# Patient Record
Sex: FEMALE | Race: WHITE | NOT HISPANIC OR LATINO
[De-identification: names, ages, dates, MRNs, and addresses within clinical notes are randomized per-mention and may not be internally consistent; named-entity substitution may affect disease eponyms.]

---

## 2017-01-19 ENCOUNTER — APPOINTMENT (OUTPATIENT)
Dept: OBGYN | Facility: CLINIC | Age: 62
End: 2017-01-19

## 2017-06-14 ENCOUNTER — APPOINTMENT (OUTPATIENT)
Dept: INTERNAL MEDICINE | Facility: CLINIC | Age: 62
End: 2017-06-14

## 2017-06-14 ENCOUNTER — OUTPATIENT (OUTPATIENT)
Dept: OUTPATIENT SERVICES | Facility: HOSPITAL | Age: 62
LOS: 1 days | Discharge: HOME | End: 2017-06-14

## 2017-06-14 VITALS
HEIGHT: 65 IN | SYSTOLIC BLOOD PRESSURE: 109 MMHG | HEART RATE: 79 BPM | DIASTOLIC BLOOD PRESSURE: 71 MMHG | WEIGHT: 175 LBS | BODY MASS INDEX: 29.16 KG/M2

## 2017-06-28 DIAGNOSIS — R07.89 OTHER CHEST PAIN: ICD-10-CM

## 2017-06-28 DIAGNOSIS — E78.5 HYPERLIPIDEMIA, UNSPECIFIED: ICD-10-CM

## 2017-08-21 ENCOUNTER — APPOINTMENT (OUTPATIENT)
Dept: OBGYN | Facility: CLINIC | Age: 62
End: 2017-08-21

## 2017-08-21 ENCOUNTER — OUTPATIENT (OUTPATIENT)
Dept: OUTPATIENT SERVICES | Facility: HOSPITAL | Age: 62
LOS: 1 days | Discharge: HOME | End: 2017-08-21

## 2017-08-21 VITALS — DIASTOLIC BLOOD PRESSURE: 60 MMHG | BODY MASS INDEX: 29.62 KG/M2 | SYSTOLIC BLOOD PRESSURE: 100 MMHG | WEIGHT: 178 LBS

## 2017-08-21 DIAGNOSIS — Z33.2 ENCOUNTER FOR ELECTIVE TERMINATION OF PREGNANCY: ICD-10-CM

## 2017-08-21 DIAGNOSIS — Z12.31 ENCOUNTER FOR SCREENING MAMMOGRAM FOR MALIGNANT NEOPLASM OF BREAST: ICD-10-CM

## 2017-08-22 ENCOUNTER — RESULT REVIEW (OUTPATIENT)
Age: 62
End: 2017-08-22

## 2017-08-25 ENCOUNTER — RESULT REVIEW (OUTPATIENT)
Age: 62
End: 2017-08-25

## 2017-08-28 LAB — HPV I/H RISK 1 DNA CVX QL PROBE+SIG AMP: NOT DETECTED

## 2017-10-19 LAB
CHOLEST SERPL-MCNC: 193 MG/DL
ESTIMATED AVERGAGE GLUCOSE (NORTH): 137 MG/DL
HBA1C MFR BLD: 6.4 %
HDLC SERPL-MCNC: 66 MG/DL
HDLC SERPL: 2.92
LDLC SERPL DIRECT ASSAY-MCNC: 105 MG/DL
TRIGL SERPL-MCNC: 115 MG/DL
VLDLC SERPL-MCNC: 23 MG/DL

## 2018-06-14 ENCOUNTER — OUTPATIENT (OUTPATIENT)
Dept: OUTPATIENT SERVICES | Facility: HOSPITAL | Age: 63
LOS: 1 days | Discharge: HOME | End: 2018-06-14

## 2018-06-14 DIAGNOSIS — Z00.00 ENCOUNTER FOR GENERAL ADULT MEDICAL EXAMINATION WITHOUT ABNORMAL FINDINGS: ICD-10-CM

## 2018-07-24 ENCOUNTER — APPOINTMENT (OUTPATIENT)
Dept: INTERNAL MEDICINE | Facility: CLINIC | Age: 63
End: 2018-07-24

## 2018-07-24 ENCOUNTER — OUTPATIENT (OUTPATIENT)
Dept: OUTPATIENT SERVICES | Facility: HOSPITAL | Age: 63
LOS: 1 days | Discharge: HOME | End: 2018-07-24

## 2018-07-24 VITALS
HEIGHT: 65 IN | DIASTOLIC BLOOD PRESSURE: 74 MMHG | WEIGHT: 183 LBS | HEART RATE: 81 BPM | BODY MASS INDEX: 30.49 KG/M2 | SYSTOLIC BLOOD PRESSURE: 118 MMHG

## 2018-07-24 DIAGNOSIS — Z87.828 PERSONAL HISTORY OF OTHER (HEALED) PHYSICAL INJURY AND TRAUMA: ICD-10-CM

## 2018-07-24 DIAGNOSIS — M89.8X9 OTHER SPECIFIED DISORDERS OF BONE, UNSPECIFIED SITE: ICD-10-CM

## 2018-07-24 DIAGNOSIS — R07.89 OTHER CHEST PAIN: ICD-10-CM

## 2018-07-24 DIAGNOSIS — Z87.2 PERSONAL HISTORY OF DISEASES OF THE SKIN AND SUBCUTANEOUS TISSUE: ICD-10-CM

## 2018-07-24 RX ORDER — ATORVASTATIN CALCIUM 80 MG/1
TABLET, FILM COATED ORAL
Refills: 0 | Status: DISCONTINUED | COMMUNITY
End: 2018-07-24

## 2018-07-24 NOTE — HISTORY OF PRESENT ILLNESS
[FreeTextEntry1] : pt complains on pain in the all bones,pain in the left lower extr. what pt related with atorvastatin.

## 2018-07-24 NOTE — ASSESSMENT
[FreeTextEntry1] : 1.Left lower extr. pain pt related with atorvastatin 40 mg,after dose of atorvastatin was decreased to 20 mg pain was decreased.Recom. switch atorvastatin to pravastatin\par 2.Prediabetes:check HgA1C\par 3.Hypercholesterolemia:recom.switch atorvastatin to pravastatin 40 mg,check lipids\par 4.Bone pain,difficulty walking:DEXa scan\par 5.Depression :pt is f/u with psych.\par 6.HCM:PAP smear,colonoscopy,mammogram UTD;recom. mammogram

## 2018-07-24 NOTE — PHYSICAL EXAM
[Supple] : supple [Clear to Auscultation] : lungs were clear to auscultation bilaterally [Regular Rhythm] : with a regular rhythm [Normal S1, S2] : normal S1 and S2 [No Murmur] : no murmur heard [No Edema] : there was no peripheral edema [Soft] : abdomen soft [Non Tender] : non-tender [Non-distended] : non-distended [No Joint Swelling] : no joint swelling

## 2018-07-25 DIAGNOSIS — E78.5 HYPERLIPIDEMIA, UNSPECIFIED: ICD-10-CM

## 2018-07-25 DIAGNOSIS — F33.0 MAJOR DEPRESSIVE DISORDER, RECURRENT, MILD: ICD-10-CM

## 2018-07-25 DIAGNOSIS — R73.9 HYPERGLYCEMIA, UNSPECIFIED: ICD-10-CM

## 2018-07-25 DIAGNOSIS — M89.8X9 OTHER SPECIFIED DISORDERS OF BONE, UNSPECIFIED SITE: ICD-10-CM

## 2018-11-09 ENCOUNTER — RX RENEWAL (OUTPATIENT)
Age: 63
End: 2018-11-09

## 2019-03-26 ENCOUNTER — FORM ENCOUNTER (OUTPATIENT)
Age: 64
End: 2019-03-26

## 2019-03-27 ENCOUNTER — APPOINTMENT (OUTPATIENT)
Dept: INTERNAL MEDICINE | Facility: CLINIC | Age: 64
End: 2019-03-27

## 2019-03-27 ENCOUNTER — OUTPATIENT (OUTPATIENT)
Dept: OUTPATIENT SERVICES | Facility: HOSPITAL | Age: 64
LOS: 1 days | Discharge: HOME | End: 2019-03-27

## 2019-03-27 VITALS
WEIGHT: 187 LBS | SYSTOLIC BLOOD PRESSURE: 120 MMHG | BODY MASS INDEX: 31.16 KG/M2 | TEMPERATURE: 96.3 F | HEART RATE: 82 BPM | HEIGHT: 65 IN | DIASTOLIC BLOOD PRESSURE: 78 MMHG

## 2019-03-27 DIAGNOSIS — R73.03 PREDIABETES: ICD-10-CM

## 2019-03-27 DIAGNOSIS — M25.569 PAIN IN UNSPECIFIED KNEE: ICD-10-CM

## 2019-03-27 DIAGNOSIS — M25.562 PAIN IN LEFT KNEE: ICD-10-CM

## 2019-03-27 DIAGNOSIS — E78.5 HYPERLIPIDEMIA, UNSPECIFIED: ICD-10-CM

## 2019-03-27 NOTE — PHYSICAL EXAM
[No Acute Distress] : no acute distress [Well Nourished] : well nourished [No Respiratory Distress] : no respiratory distress  [Clear to Auscultation] : lungs were clear to auscultation bilaterally [Normal Rate] : normal rate  [Regular Rhythm] : with a regular rhythm [Normal S1, S2] : normal S1 and S2 [Soft] : abdomen soft [Non Tender] : non-tender [No HSM] : no HSM [de-identified] : left knee pain on palpation
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
## 2019-03-27 NOTE — ASSESSMENT
[FreeTextEntry1] : 61 year old female with past medical history of hyperlipidemia, prediabetes presents left knee pain. \par \par # Left knee pain, likely associated with meniscal damage as per examination and history\par - XR L knee ordered\par - c/w pain meds prn \par \par # Prediabetes\par -check HgA1C\par \par # Hypercholesterolemia\par - c/w pravastatin 40mg\par \par # Depression\par - pt is f/u with psych\par \par HCM\par PAP smear\par colonoscopy\par mammogram\par UTD\par mammogram

## 2019-03-27 NOTE — REVIEW OF SYSTEMS
[Joint Pain] : joint pain [Joint Stiffness] : joint stiffness [Joint Swelling] : joint swelling [Muscle Weakness] : muscle weakness [Back Pain] : back pain [Negative] : Gastrointestinal

## 2019-03-29 DIAGNOSIS — Z02.9 ENCOUNTER FOR ADMINISTRATIVE EXAMINATIONS, UNSPECIFIED: ICD-10-CM

## 2019-04-10 ENCOUNTER — OUTPATIENT (OUTPATIENT)
Dept: OUTPATIENT SERVICES | Facility: HOSPITAL | Age: 64
LOS: 1 days | Discharge: HOME | End: 2019-04-10

## 2019-04-10 ENCOUNTER — APPOINTMENT (OUTPATIENT)
Dept: ORTHOPEDIC SURGERY | Facility: CLINIC | Age: 64
End: 2019-04-10

## 2019-04-16 ENCOUNTER — OUTPATIENT (OUTPATIENT)
Dept: OUTPATIENT SERVICES | Facility: HOSPITAL | Age: 64
LOS: 1 days | Discharge: HOME | End: 2019-04-16
Payer: MEDICARE

## 2019-04-16 DIAGNOSIS — M25.569 PAIN IN UNSPECIFIED KNEE: ICD-10-CM

## 2019-04-16 PROCEDURE — 73721 MRI JNT OF LWR EXTRE W/O DYE: CPT | Mod: 26,LT

## 2019-04-30 ENCOUNTER — MEDICATION RENEWAL (OUTPATIENT)
Age: 64
End: 2019-04-30

## 2019-05-01 ENCOUNTER — APPOINTMENT (OUTPATIENT)
Dept: ORTHOPEDIC SURGERY | Facility: CLINIC | Age: 64
End: 2019-05-01

## 2019-05-01 ENCOUNTER — OUTPATIENT (OUTPATIENT)
Dept: OUTPATIENT SERVICES | Facility: HOSPITAL | Age: 64
LOS: 1 days | Discharge: HOME | End: 2019-05-01

## 2019-05-14 ENCOUNTER — OUTPATIENT (OUTPATIENT)
Dept: OUTPATIENT SERVICES | Facility: HOSPITAL | Age: 64
LOS: 1 days | Discharge: HOME | End: 2019-05-14
Payer: MEDICARE

## 2019-05-14 VITALS
SYSTOLIC BLOOD PRESSURE: 104 MMHG | HEART RATE: 90 BPM | DIASTOLIC BLOOD PRESSURE: 66 MMHG | TEMPERATURE: 98 F | RESPIRATION RATE: 20 BRPM | HEIGHT: 65 IN | WEIGHT: 185.41 LBS | OXYGEN SATURATION: 100 %

## 2019-05-14 DIAGNOSIS — S83.272A COMPLEX TEAR OF LATERAL MENISCUS, CURRENT INJURY, LEFT KNEE, INITIAL ENCOUNTER: ICD-10-CM

## 2019-05-14 DIAGNOSIS — Z01.818 ENCOUNTER FOR OTHER PREPROCEDURAL EXAMINATION: ICD-10-CM

## 2019-05-14 LAB
ALBUMIN SERPL ELPH-MCNC: 4.1 G/DL — SIGNIFICANT CHANGE UP (ref 3.5–5.2)
ALP SERPL-CCNC: 59 U/L — SIGNIFICANT CHANGE UP (ref 30–115)
ALT FLD-CCNC: 14 U/L — SIGNIFICANT CHANGE UP (ref 0–41)
ANION GAP SERPL CALC-SCNC: 16 MMOL/L — HIGH (ref 7–14)
APTT BLD: 32.6 SEC — SIGNIFICANT CHANGE UP (ref 27–39.2)
AST SERPL-CCNC: 13 U/L — SIGNIFICANT CHANGE UP (ref 0–41)
BASOPHILS # BLD AUTO: 0.05 K/UL — SIGNIFICANT CHANGE UP (ref 0–0.2)
BASOPHILS NFR BLD AUTO: 0.6 % — SIGNIFICANT CHANGE UP (ref 0–1)
BILIRUB SERPL-MCNC: 0.5 MG/DL — SIGNIFICANT CHANGE UP (ref 0.2–1.2)
BUN SERPL-MCNC: 20 MG/DL — SIGNIFICANT CHANGE UP (ref 10–20)
CALCIUM SERPL-MCNC: 9.3 MG/DL — SIGNIFICANT CHANGE UP (ref 8.5–10.1)
CHLORIDE SERPL-SCNC: 102 MMOL/L — SIGNIFICANT CHANGE UP (ref 98–110)
CO2 SERPL-SCNC: 24 MMOL/L — SIGNIFICANT CHANGE UP (ref 17–32)
CREAT SERPL-MCNC: 0.8 MG/DL — SIGNIFICANT CHANGE UP (ref 0.7–1.5)
EOSINOPHIL # BLD AUTO: 0.26 K/UL — SIGNIFICANT CHANGE UP (ref 0–0.7)
EOSINOPHIL NFR BLD AUTO: 3.1 % — SIGNIFICANT CHANGE UP (ref 0–8)
GLUCOSE SERPL-MCNC: 116 MG/DL — HIGH (ref 70–99)
HCT VFR BLD CALC: 37 % — SIGNIFICANT CHANGE UP (ref 37–47)
HGB BLD-MCNC: 11.9 G/DL — LOW (ref 12–16)
IMM GRANULOCYTES NFR BLD AUTO: 0.4 % — HIGH (ref 0.1–0.3)
INR BLD: 0.97 RATIO — SIGNIFICANT CHANGE UP (ref 0.65–1.3)
LYMPHOCYTES # BLD AUTO: 2.02 K/UL — SIGNIFICANT CHANGE UP (ref 1.2–3.4)
LYMPHOCYTES # BLD AUTO: 24.1 % — SIGNIFICANT CHANGE UP (ref 20.5–51.1)
MCHC RBC-ENTMCNC: 28.1 PG — SIGNIFICANT CHANGE UP (ref 27–31)
MCHC RBC-ENTMCNC: 32.2 G/DL — SIGNIFICANT CHANGE UP (ref 32–37)
MCV RBC AUTO: 87.5 FL — SIGNIFICANT CHANGE UP (ref 81–99)
MONOCYTES # BLD AUTO: 0.6 K/UL — SIGNIFICANT CHANGE UP (ref 0.1–0.6)
MONOCYTES NFR BLD AUTO: 7.2 % — SIGNIFICANT CHANGE UP (ref 1.7–9.3)
NEUTROPHILS # BLD AUTO: 5.42 K/UL — SIGNIFICANT CHANGE UP (ref 1.4–6.5)
NEUTROPHILS NFR BLD AUTO: 64.6 % — SIGNIFICANT CHANGE UP (ref 42.2–75.2)
NRBC # BLD: 0 /100 WBCS — SIGNIFICANT CHANGE UP (ref 0–0)
PLATELET # BLD AUTO: 323 K/UL — SIGNIFICANT CHANGE UP (ref 130–400)
POTASSIUM SERPL-MCNC: 4.2 MMOL/L — SIGNIFICANT CHANGE UP (ref 3.5–5)
POTASSIUM SERPL-SCNC: 4.2 MMOL/L — SIGNIFICANT CHANGE UP (ref 3.5–5)
PROT SERPL-MCNC: 6.5 G/DL — SIGNIFICANT CHANGE UP (ref 6–8)
PROTHROM AB SERPL-ACNC: 11.2 SEC — SIGNIFICANT CHANGE UP (ref 9.95–12.87)
RBC # BLD: 4.23 M/UL — SIGNIFICANT CHANGE UP (ref 4.2–5.4)
RBC # FLD: 12.9 % — SIGNIFICANT CHANGE UP (ref 11.5–14.5)
SODIUM SERPL-SCNC: 142 MMOL/L — SIGNIFICANT CHANGE UP (ref 135–146)
WBC # BLD: 8.38 K/UL — SIGNIFICANT CHANGE UP (ref 4.8–10.8)
WBC # FLD AUTO: 8.38 K/UL — SIGNIFICANT CHANGE UP (ref 4.8–10.8)

## 2019-05-14 PROCEDURE — 93010 ELECTROCARDIOGRAM REPORT: CPT

## 2019-05-14 PROCEDURE — 71046 X-RAY EXAM CHEST 2 VIEWS: CPT | Mod: 26

## 2019-05-14 NOTE — H&P PST ADULT - HISTORY OF PRESENT ILLNESS
63 y/o scheduled for left knee arthroscopy lateral meniscectomy  reports no c/o cp,sob,palpitations,cough or dysuria  1-2 fos without sob

## 2019-05-17 ENCOUNTER — OUTPATIENT (OUTPATIENT)
Dept: OUTPATIENT SERVICES | Facility: HOSPITAL | Age: 64
LOS: 1 days | Discharge: HOME | End: 2019-05-17

## 2019-05-17 ENCOUNTER — APPOINTMENT (OUTPATIENT)
Dept: INTERNAL MEDICINE | Facility: CLINIC | Age: 64
End: 2019-05-17

## 2019-05-17 VITALS — SYSTOLIC BLOOD PRESSURE: 122 MMHG | HEART RATE: 86 BPM | DIASTOLIC BLOOD PRESSURE: 81 MMHG

## 2019-05-17 DIAGNOSIS — M25.569 PAIN IN UNSPECIFIED KNEE: ICD-10-CM

## 2019-05-17 PROBLEM — E78.00 PURE HYPERCHOLESTEROLEMIA, UNSPECIFIED: Chronic | Status: ACTIVE | Noted: 2019-05-14

## 2019-05-17 PROBLEM — F32.9 MAJOR DEPRESSIVE DISORDER, SINGLE EPISODE, UNSPECIFIED: Chronic | Status: ACTIVE | Noted: 2019-05-14

## 2019-05-17 NOTE — HISTORY OF PRESENT ILLNESS
[de-identified] : 61 year old female with past medical history of hyperlipidemia, prediabetes presents left knee pain. being evlauated by orhto for osteoarthritis here for pre op testing clearance. Pt r/o meniscal tear. EKG CXR and Labs all wnl

## 2019-05-17 NOTE — PHYSICAL EXAM
[No Acute Distress] : no acute distress [Well Nourished] : well nourished [No Respiratory Distress] : no respiratory distress  [Clear to Auscultation] : lungs were clear to auscultation bilaterally [Normal Rate] : normal rate  [Regular Rhythm] : with a regular rhythm [Normal S1, S2] : normal S1 and S2 [Soft] : abdomen soft [Non Tender] : non-tender [No HSM] : no HSM [de-identified] : left knee pain on palpation

## 2019-05-17 NOTE — ASSESSMENT
[FreeTextEntry1] : 61 year old female with past medical history of hyperlipidemia, prediabetes presents left knee pain. \par \par # Left knee pain scheduled for L knee arthroscopy here for pre op clearance\par - XR L knee ordered lateral osteo arthrisits. Low risk for low risk procedure. Paper work signed\par - \par # Prediabetes -check HgA1C\par # Hypercholesterolemia - c/w pravastatin 40mg\par # Depression- pt is f/u with psych\par # HCM - PAP smear colonoscopy mammogram UTD. \par rto in 2 monthsand prn

## 2019-06-05 ENCOUNTER — APPOINTMENT (OUTPATIENT)
Dept: INTERNAL MEDICINE | Facility: CLINIC | Age: 64
End: 2019-06-05

## 2019-06-05 ENCOUNTER — OUTPATIENT (OUTPATIENT)
Dept: OUTPATIENT SERVICES | Facility: HOSPITAL | Age: 64
LOS: 1 days | Discharge: HOME | End: 2019-06-05

## 2019-06-05 VITALS
HEIGHT: 65 IN | DIASTOLIC BLOOD PRESSURE: 79 MMHG | BODY MASS INDEX: 30.16 KG/M2 | HEART RATE: 93 BPM | WEIGHT: 181 LBS | SYSTOLIC BLOOD PRESSURE: 124 MMHG

## 2019-06-05 NOTE — HISTORY OF PRESENT ILLNESS
[de-identified] : 61 year old female with past medical history of hyperlipidemia, prediabetes presents left knee pain. being evaluated by ortho for osteoarthritis here for pre op testing clearance. Pt r/o meniscal tear. EKG CXR and Labs all wnl.  Patient returned to office today for preop clearance once again for lesiions on her right arm.  As per patient she was about to have surgery however admitted to the staff at the surgical center that she had a bed bug infection in recent past.  The surgery was cancelled and patient was told to return to a medical clinic for clearance.  Patient is no longer infected with bed bugs.  Clearance will once again be given this visit.

## 2019-06-05 NOTE — ASSESSMENT
[FreeTextEntry1] : 61 year old female with past medical history of hyperlipidemia, prediabetes presents left knee pain. \par \par # Left knee pain scheduled for L knee arthroscopy here for pre op clearance\par - XR L knee ordered lateral osteo arthrisits. Low risk for low risk procedure\par - Patient has no evidence of active bed bug infection and lesions on right arm are healed and old.  Patient is perfectly ok to proceed with planned surgery.  \par - \par # Prediabetes -check HgA1C\par # Hypercholesterolemia - c/w pravastatin 40mg\par # Depression- pt is f/u with psych\par # HCM - PAP smear colonoscopy mammogram UTD. \par rto in 2 monthsand prn

## 2019-06-06 DIAGNOSIS — E78.5 HYPERLIPIDEMIA, UNSPECIFIED: ICD-10-CM

## 2019-06-06 DIAGNOSIS — R73.03 PREDIABETES: ICD-10-CM

## 2019-06-06 DIAGNOSIS — S83.289A OTHER TEAR OF LATERAL MENISCUS, CURRENT INJURY, UNSPECIFIED KNEE, INITIAL ENCOUNTER: ICD-10-CM

## 2019-06-13 ENCOUNTER — OUTPATIENT (OUTPATIENT)
Dept: OUTPATIENT SERVICES | Facility: HOSPITAL | Age: 64
LOS: 1 days | Discharge: HOME | End: 2019-06-13
Payer: MEDICARE

## 2019-06-13 ENCOUNTER — RESULT REVIEW (OUTPATIENT)
Age: 64
End: 2019-06-13

## 2019-06-13 VITALS
HEART RATE: 76 BPM | SYSTOLIC BLOOD PRESSURE: 137 MMHG | DIASTOLIC BLOOD PRESSURE: 63 MMHG | RESPIRATION RATE: 16 BRPM | OXYGEN SATURATION: 99 %

## 2019-06-13 VITALS
WEIGHT: 185.41 LBS | RESPIRATION RATE: 18 BRPM | SYSTOLIC BLOOD PRESSURE: 138 MMHG | DIASTOLIC BLOOD PRESSURE: 75 MMHG | OXYGEN SATURATION: 96 % | HEIGHT: 65 IN | TEMPERATURE: 98 F | HEART RATE: 88 BPM

## 2019-06-13 DIAGNOSIS — S83.259A BUCKET-HANDLE TEAR OF LATERAL MENISCUS, CURRENT INJURY, UNSPECIFIED KNEE, INITIAL ENCOUNTER: ICD-10-CM

## 2019-06-13 PROCEDURE — 88304 TISSUE EXAM BY PATHOLOGIST: CPT | Mod: 26

## 2019-06-13 RX ORDER — BUPROPION HYDROCHLORIDE 150 MG/1
1 TABLET, EXTENDED RELEASE ORAL
Qty: 0 | Refills: 0 | DISCHARGE

## 2019-06-13 RX ORDER — HYDROMORPHONE HYDROCHLORIDE 2 MG/ML
0.5 INJECTION INTRAMUSCULAR; INTRAVENOUS; SUBCUTANEOUS
Refills: 0 | Status: DISCONTINUED | OUTPATIENT
Start: 2019-06-13 | End: 2019-06-13

## 2019-06-13 RX ORDER — ONDANSETRON 8 MG/1
4 TABLET, FILM COATED ORAL ONCE
Refills: 0 | Status: DISCONTINUED | OUTPATIENT
Start: 2019-06-13 | End: 2019-06-28

## 2019-06-13 RX ORDER — OXYCODONE HYDROCHLORIDE 5 MG/1
5 TABLET ORAL ONCE
Refills: 0 | Status: DISCONTINUED | OUTPATIENT
Start: 2019-06-13 | End: 2019-06-13

## 2019-06-13 RX ORDER — SODIUM CHLORIDE 9 MG/ML
1000 INJECTION, SOLUTION INTRAVENOUS
Refills: 0 | Status: DISCONTINUED | OUTPATIENT
Start: 2019-06-13 | End: 2019-06-28

## 2019-06-13 RX ADMIN — OXYCODONE HYDROCHLORIDE 5 MILLIGRAM(S): 5 TABLET ORAL at 15:57

## 2019-06-13 RX ADMIN — OXYCODONE HYDROCHLORIDE 5 MILLIGRAM(S): 5 TABLET ORAL at 17:11

## 2019-06-13 RX ADMIN — HYDROMORPHONE HYDROCHLORIDE 0.5 MILLIGRAM(S): 2 INJECTION INTRAMUSCULAR; INTRAVENOUS; SUBCUTANEOUS at 14:52

## 2019-06-13 RX ADMIN — HYDROMORPHONE HYDROCHLORIDE 0.5 MILLIGRAM(S): 2 INJECTION INTRAMUSCULAR; INTRAVENOUS; SUBCUTANEOUS at 15:22

## 2019-06-13 RX ADMIN — HYDROMORPHONE HYDROCHLORIDE 0.5 MILLIGRAM(S): 2 INJECTION INTRAMUSCULAR; INTRAVENOUS; SUBCUTANEOUS at 17:11

## 2019-06-13 RX ADMIN — HYDROMORPHONE HYDROCHLORIDE 0.5 MILLIGRAM(S): 2 INJECTION INTRAMUSCULAR; INTRAVENOUS; SUBCUTANEOUS at 15:29

## 2019-06-13 RX ADMIN — SODIUM CHLORIDE 50 MILLILITER(S): 9 INJECTION, SOLUTION INTRAVENOUS at 15:12

## 2019-06-13 RX ADMIN — HYDROMORPHONE HYDROCHLORIDE 0.5 MILLIGRAM(S): 2 INJECTION INTRAMUSCULAR; INTRAVENOUS; SUBCUTANEOUS at 15:03

## 2019-06-13 NOTE — ASU DISCHARGE PLAN (ADULT/PEDIATRIC) - ASU DC SPECIAL INSTRUCTIONSFT
Post-Operative Knee Arthroscopy Instructions    Anesthesia:  - No Alcoholic beverages, including beer and wine, for 24 hours or while on prescribed pain medications  - Do not make any important decisions or sign any legal documents  - Do not drive or operate machinery for 24 hours  - You are required upon discharge to leave the surgical center with a responsible adult who will drive you home    Surgery:  - Stay indoors for 2 days  - Continue weight bearing as tolerated - only use crutches for severe pain  - Stairs can be done one step at a time  - Keep clean and dry for 3 days  - Remove dressing in 3 days and cover wounds with band-aids, then you can shower  - If knee is swollen, ice for 10 minutes, 3 times daily while awake  - Ankle range of motion 10 times every hour when awake to both lower extremities for 3 days.  This increases circulation and decreases swelling and decreases the chance for clots  - Take pain medication as prescribed  - Resume regular diet  - Follow-up in approximately 1 week    FOR QUESTIONS OR CONCERNS, CALL [phone]    Notify your doctor if you develop: fever, chills, excessive sweating, drainage, pain not controlled by pain medication      IF AN EMERGENCY ARISES CALL [phone] AND/OR GO TO THE EMERGENCY ROOM    DR. WEI  [phone]

## 2019-06-13 NOTE — CHART NOTE - NSCHARTNOTEFT_GEN_A_CORE
PACU ANESTHESIA ADMISSION NOTE      Procedure: Lateral meniscectomy of left knee    Post op diagnosis:  Bucket handle tear of lateral meniscus      ____  Intubated  TV:______       Rate: ______      FiO2: ______    _x___  Patent Airway    __x__  Full return of protective reflexes    ___x_  Full recovery from anesthesia / back to baseline         Mental Status:  ____ Awake   _____ Alert   ___x__ Drowsy   _____ Sedated    Nausea/Vomiting:  ____ NO  ______Yes,   See Post - Op Orders          Pain Scale (0-10):  _____    Treatment: ____ None    __x__ See Post - Op/PCA Orders    Post - Operative Fluids:   ____ Oral   _x___ See Post - Op Orders    Plan: Discharge:   __x__Home       _____Floor     _____Critical Care    _____  Other:_________________    Comments:

## 2019-06-13 NOTE — PRE-ANESTHESIA EVALUATION ADULT - NSANTHADDINFOFT_GEN_ALL_CORE
Patient has lesions on right arm that were suspected to be prior bed bud infection.  Note in chart states that "patient has no evidence of active bed bug infection and lesions on right arm are healed and old.  Patient is perfectly ok to proceed with planned surgery."

## 2019-06-17 LAB — SURGICAL PATHOLOGY STUDY: SIGNIFICANT CHANGE UP

## 2019-06-19 DIAGNOSIS — M65.88 OTHER SYNOVITIS AND TENOSYNOVITIS, OTHER SITE: ICD-10-CM
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
## 2019-06-19 DIAGNOSIS — S83.252A BUCKET-HANDLE TEAR OF LATERAL MENISCUS, CURRENT INJURY, LEFT KNEE, INITIAL ENCOUNTER: ICD-10-CM

## 2019-06-19 DIAGNOSIS — E78.00 PURE HYPERCHOLESTEROLEMIA, UNSPECIFIED: ICD-10-CM

## 2019-06-19 DIAGNOSIS — F32.9 MAJOR DEPRESSIVE DISORDER, SINGLE EPISODE, UNSPECIFIED: ICD-10-CM

## 2019-06-19 DIAGNOSIS — Z88.5 ALLERGY STATUS TO NARCOTIC AGENT: ICD-10-CM
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
## 2019-12-10 ENCOUNTER — APPOINTMENT (OUTPATIENT)
Dept: INTERNAL MEDICINE | Facility: CLINIC | Age: 64
End: 2019-12-10
Payer: MEDICARE

## 2019-12-10 ENCOUNTER — OUTPATIENT (OUTPATIENT)
Dept: OUTPATIENT SERVICES | Facility: HOSPITAL | Age: 64
LOS: 1 days | Discharge: HOME | End: 2019-12-10

## 2019-12-10 DIAGNOSIS — Z82.49 FAMILY HISTORY OF ISCHEMIC HEART DISEASE AND OTHER DISEASES OF THE CIRCULATORY SYSTEM: ICD-10-CM

## 2019-12-10 PROCEDURE — 99213 OFFICE O/P EST LOW 20 MIN: CPT | Mod: GC

## 2019-12-10 NOTE — HISTORY OF PRESENT ILLNESS
[FreeTextEntry1] : no complaints [de-identified] : f/u h/o depression stable onbuprobion f/u with psych.dyslipidemia on pravastatin

## 2019-12-16 DIAGNOSIS — Z00.00 ENCOUNTER FOR GENERAL ADULT MEDICAL EXAMINATION WITHOUT ABNORMAL FINDINGS: ICD-10-CM

## 2019-12-16 DIAGNOSIS — F32.9 MAJOR DEPRESSIVE DISORDER, SINGLE EPISODE, UNSPECIFIED: ICD-10-CM

## 2019-12-16 DIAGNOSIS — Z82.49 FAMILY HISTORY OF ISCHEMIC HEART DISEASE AND OTHER DISEASES OF THE CIRCULATORY SYSTEM: ICD-10-CM

## 2019-12-16 DIAGNOSIS — E78.5 HYPERLIPIDEMIA, UNSPECIFIED: ICD-10-CM

## 2020-04-15 NOTE — H&P PST ADULT - PRO INTERPRETER NEED 2
Please have the patient follow-up with me in 3 months. Schedule it for a video visit now and if we are open to see patients in the next 3 months please change it to a regular office visit.
English

## 2020-06-15 ENCOUNTER — RX RENEWAL (OUTPATIENT)
Age: 65
End: 2020-06-15

## 2020-07-07 ENCOUNTER — APPOINTMENT (OUTPATIENT)
Dept: INTERNAL MEDICINE | Facility: CLINIC | Age: 65
End: 2020-07-07

## 2020-11-05 ENCOUNTER — RESULT REVIEW (OUTPATIENT)
Age: 65
End: 2020-11-05

## 2020-11-05 ENCOUNTER — OUTPATIENT (OUTPATIENT)
Dept: OUTPATIENT SERVICES | Facility: HOSPITAL | Age: 65
LOS: 1 days | Discharge: HOME | End: 2020-11-05
Payer: MEDICARE

## 2020-11-05 DIAGNOSIS — Z12.31 ENCOUNTER FOR SCREENING MAMMOGRAM FOR MALIGNANT NEOPLASM OF BREAST: ICD-10-CM

## 2020-11-05 PROCEDURE — 77067 SCR MAMMO BI INCL CAD: CPT | Mod: 26

## 2020-11-05 PROCEDURE — 77063 BREAST TOMOSYNTHESIS BI: CPT | Mod: 26

## 2021-07-09 ENCOUNTER — EMERGENCY (EMERGENCY)
Facility: HOSPITAL | Age: 66
LOS: 0 days | Discharge: HOME | End: 2021-07-09
Attending: STUDENT IN AN ORGANIZED HEALTH CARE EDUCATION/TRAINING PROGRAM | Admitting: STUDENT IN AN ORGANIZED HEALTH CARE EDUCATION/TRAINING PROGRAM
Payer: MEDICARE

## 2021-07-09 VITALS
SYSTOLIC BLOOD PRESSURE: 125 MMHG | HEIGHT: 65 IN | OXYGEN SATURATION: 97 % | DIASTOLIC BLOOD PRESSURE: 64 MMHG | HEART RATE: 86 BPM | TEMPERATURE: 98 F | RESPIRATION RATE: 20 BRPM | WEIGHT: 190.04 LBS

## 2021-07-09 DIAGNOSIS — Z79.899 OTHER LONG TERM (CURRENT) DRUG THERAPY: ICD-10-CM

## 2021-07-09 DIAGNOSIS — R21 RASH AND OTHER NONSPECIFIC SKIN ERUPTION: ICD-10-CM

## 2021-07-09 DIAGNOSIS — Z82.49 FAMILY HISTORY OF ISCHEMIC HEART DISEASE AND OTHER DISEASES OF THE CIRCULATORY SYSTEM: ICD-10-CM

## 2021-07-09 DIAGNOSIS — E78.00 PURE HYPERCHOLESTEROLEMIA, UNSPECIFIED: ICD-10-CM

## 2021-07-09 DIAGNOSIS — Z79.02 LONG TERM (CURRENT) USE OF ANTITHROMBOTICS/ANTIPLATELETS: ICD-10-CM

## 2021-07-09 DIAGNOSIS — E78.5 HYPERLIPIDEMIA, UNSPECIFIED: ICD-10-CM

## 2021-07-09 DIAGNOSIS — J45.909 UNSPECIFIED ASTHMA, UNCOMPLICATED: ICD-10-CM

## 2021-07-09 DIAGNOSIS — F32.9 MAJOR DEPRESSIVE DISORDER, SINGLE EPISODE, UNSPECIFIED: ICD-10-CM

## 2021-07-09 DIAGNOSIS — Z88.5 ALLERGY STATUS TO NARCOTIC AGENT: ICD-10-CM

## 2021-07-09 DIAGNOSIS — Z85.038 PERSONAL HISTORY OF OTHER MALIGNANT NEOPLASM OF LARGE INTESTINE: ICD-10-CM

## 2021-07-09 PROCEDURE — 99283 EMERGENCY DEPT VISIT LOW MDM: CPT | Mod: GC

## 2021-07-09 RX ORDER — DIPHENHYDRAMINE HCL 50 MG
1 CAPSULE ORAL
Qty: 5 | Refills: 0
Start: 2021-07-09 | End: 2021-07-13

## 2021-07-09 RX ADMIN — Medication 20 MILLIGRAM(S): at 17:54

## 2021-07-09 NOTE — ED PROVIDER NOTE - PHYSICAL EXAMINATION
Vital Signs: Reviewed  GEN: alert, NAD, speaks full sentences  HEAD:  normocephalic, atraumatic  EYES:  PERRLA; conjunctivae without injection, drainage or discharge  ENMT:  nasal mucosa moist; mouth moist without ulcerations or lesions; throat moist without erythema, exudate, ulcerations or lesions  NECK:  supple, no stridor on auscultation  CARDIAC:  regular rate, normal S1 and S2, no murmurs  RESP:  respiratory rate and effort appear normal for age; lungs are clear to auscultation bilaterally; no stridor, rales, or wheezes  ABDOMEN:  soft, nontender, nondistended  MUSCULOSKELETAL/NEURO:  normal movement, normal tone  SKIN: numerous patches of erythema and vesicles over volar arms, chest, lower neck; otherwise normal skin color for age and race, well-perfused; warm and dry

## 2021-07-09 NOTE — ED PROVIDER NOTE - PATIENT PORTAL LINK FT
You can access the FollowMyHealth Patient Portal offered by [facility] by registering at the following website: [email or web address]. By joining [employer]’s FollowMyHealth portal, you will also be able to view your health information using other applications (apps) compatible with our system.

## 2021-07-09 NOTE — ED ADULT TRIAGE NOTE - CHIEF COMPLAINT QUOTE
Pt presented with rash to B/L arms/hands/chest. Pt states she was gardening yesterday and tried cutting poison ivy. No c/o throat/facial/tongue swelling. Denies tingling to tongue or throat.

## 2021-07-09 NOTE — ED PROVIDER NOTE - FAMILY HISTORY
Father  Still living? Unknown  FH: CAD (coronary artery disease), Age at diagnosis: Age Unknown  FHx: colon cancer, Age at diagnosis: Age Unknown

## 2021-07-09 NOTE — ED PROVIDER NOTE - OBJECTIVE STATEMENT
66yF pmhx HLD, asthma, depression c/o rash 1day; constant, itchy, stable; distributed over arms and upper body; states she was pruning poison ivy outside of a school yesterday, started feeling itchy spot on arm, scratched it overnight and noticed it spread; reports numerous exposures to poison ivy in past, current sx feel similar, states they are usually alleviated by prednisone. Denies throat scratching, SOB, chest pain, fever/chills, abd pain, n/v/d.

## 2021-07-09 NOTE — ED PROVIDER NOTE - CARE PROVIDER_API CALL
Bairon Han)  Internal Medicine  [address] [place] [address], [address] [place] [address]  Phone: [phone]  Fax: [phone]  Established Patient  Follow Up Time: 1-3 Days

## 2021-07-09 NOTE — ED PROVIDER NOTE - NSFOLLOWUPINSTRUCTIONS_ED_ALL_ED_FT
Please follow up with your primary care doctor in 1-3 days for further evaluation. Return to the emergency department sooner for any new or worsening symptoms.    Rash    A rash is a change in the color of the skin. A rash can also change the way your skin feels. There are many different conditions and factors that can cause a rash, most of which are not dangerous. Make sure to follow up with your primary care physician or a dermatologist as instructed by your health care provider.    SEEK IMMEDIATE MEDICAL CARE IF YOU HAVE THE FOLLOWING SYMPTOMS: fever, blisters, a rash inside your mouth, or altered mental status.

## 2021-07-09 NOTE — ED PROVIDER NOTE - CLINICAL SUMMARY MEDICAL DECISION MAKING FREE TEXT BOX
66yF pmhx HLD, asthma, depression who presents for itchy rash after she was trimming poison ivy yesterday. She noticed 1 spot on her chest that was itchy and noticed it spread to bl arms. Reports Sx resolved in the past with ccs. Gen - NAD, Head - NCAT, Pharynx - clear, MMM, Heart - RRR, no m/g/r, Lungs - CTAB, no w/c/r, Abdomen - soft, NT, ND, Skin - wheales and vesicles to chest and volar arms, back. No erythema. No crepitus, Extremities - FROM, no edema, erythema, ecchymosis, brisk cap refill, Neuro - A&O x3, equal strength and sensation, non-focal exam. Pt given prednisone, recommend po benadryl, and dx with steroid taper. I have fully discussed the medical management and delivery of care with the patient. I have discussed any available labs, imaging and treatment options with the patient. All Questions answered at the bedside and printed copies of all results provided and recommended to review with PCP. Patient confirms understanding and has been given detailed return precautions. Patient instructed to return to the ED should symptoms persist or worsen. Patient has demonstrated capacity and has verbalized understanding. Patient is well appearing upon discharge, ambulatory with a steady gait.

## 2021-07-09 NOTE — ED ADULT TRIAGE NOTE - NS ED NURSE BANDS TYPE
Name band; Dorsal Nasal Flap Text: The defect edges were debeveled with a #15 scalpel blade.  Given the location of the defect and the proximity to free margins a dorsal nasal flap was deemed most appropriate.  Using a sterile surgical marker, an appropriate dorsal nasal flap was drawn around the defect.    The area thus outlined was incised deep to adipose tissue with a #15 scalpel blade.  The skin margins were undermined to an appropriate distance in all directions utilizing iris scissors.

## 2021-07-30 PROBLEM — J45.909 UNSPECIFIED ASTHMA, UNCOMPLICATED: Chronic | Status: ACTIVE | Noted: 2021-07-09

## 2021-08-04 ENCOUNTER — OUTPATIENT (OUTPATIENT)
Dept: OUTPATIENT SERVICES | Facility: HOSPITAL | Age: 66
LOS: 1 days | Discharge: HOME | End: 2021-08-04

## 2021-08-04 ENCOUNTER — NON-APPOINTMENT (OUTPATIENT)
Age: 66
End: 2021-08-04

## 2021-08-04 ENCOUNTER — APPOINTMENT (OUTPATIENT)
Dept: INTERNAL MEDICINE | Facility: CLINIC | Age: 66
End: 2021-08-04
Payer: MEDICARE

## 2021-08-04 VITALS
SYSTOLIC BLOOD PRESSURE: 129 MMHG | HEIGHT: 65 IN | BODY MASS INDEX: 30.99 KG/M2 | TEMPERATURE: 96.6 F | HEART RATE: 78 BPM | WEIGHT: 186 LBS | DIASTOLIC BLOOD PRESSURE: 80 MMHG | OXYGEN SATURATION: 97 %

## 2021-08-04 PROCEDURE — 99213 OFFICE O/P EST LOW 20 MIN: CPT | Mod: GC

## 2021-08-04 RX ORDER — ESCITALOPRAM OXALATE 20 MG/1
20 TABLET, FILM COATED ORAL
Refills: 0 | Status: COMPLETED | COMMUNITY
End: 2021-08-04

## 2021-08-06 NOTE — HISTORY OF PRESENT ILLNESS
[FreeTextEntry1] : Patient came to be referred to  to fill out form for [employer] to attend PT sessions.  [de-identified] : 65 yo with history of depression and hyperlipidemia came for the referral to see . Patient prefers to come back on a different day for annual exam with her PMD.

## 2021-08-06 NOTE — PHYSICAL EXAM
[No Acute Distress] : no acute distress [Well Nourished] : well nourished [Well Developed] : well developed [Normal Sclera/Conjunctiva] : normal sclera/conjunctiva [Normal Outer Ear/Nose] : the outer ears and nose were normal in appearance [No JVD] : no jugular venous distention [No Respiratory Distress] : no respiratory distress  [No Edema] : there was no peripheral edema [Normal] : no joint swelling and grossly normal strength and tone

## 2021-08-06 NOTE — ASSESSMENT
[FreeTextEntry1] : 67 yo female with H/o depression and hyperlipidemia presented for the referral to see .\par Referral given. Patient was educated about age appropriate screening, counselled on necessity of doing Mammogram, colonoscopy, PAP . Routine labs were offered. \par Patient expressed understanding, she prefers to come back for her annual exam and see her PMD. \par Patient was made aware of his medical status; all questions, concerns and alternatives were outlined and discussed in details; patient was informed about risks and outcomes of untreated conditions or poor compliance.\par She  verbalized understanding and agreed with above.\par Compliance adherence was discussed\par

## 2021-08-09 ENCOUNTER — NON-APPOINTMENT (OUTPATIENT)
Age: 66
End: 2021-08-09

## 2021-08-11 DIAGNOSIS — E78.5 HYPERLIPIDEMIA, UNSPECIFIED: ICD-10-CM

## 2021-08-11 DIAGNOSIS — Z00.00 ENCOUNTER FOR GENERAL ADULT MEDICAL EXAMINATION WITHOUT ABNORMAL FINDINGS: ICD-10-CM

## 2021-08-11 DIAGNOSIS — F32.9 MAJOR DEPRESSIVE DISORDER, SINGLE EPISODE, UNSPECIFIED: ICD-10-CM

## 2021-11-17 ENCOUNTER — RESULT REVIEW (OUTPATIENT)
Age: 66
End: 2021-11-17

## 2021-11-17 ENCOUNTER — OUTPATIENT (OUTPATIENT)
Dept: OUTPATIENT SERVICES | Facility: HOSPITAL | Age: 66
LOS: 1 days | Discharge: HOME | End: 2021-11-17
Payer: MEDICARE

## 2021-11-17 DIAGNOSIS — Z12.31 ENCOUNTER FOR SCREENING MAMMOGRAM FOR MALIGNANT NEOPLASM OF BREAST: ICD-10-CM

## 2021-11-17 PROCEDURE — 77067 SCR MAMMO BI INCL CAD: CPT | Mod: 26

## 2021-11-17 PROCEDURE — 77063 BREAST TOMOSYNTHESIS BI: CPT | Mod: 26

## 2022-05-16 ENCOUNTER — OUTPATIENT (OUTPATIENT)
Dept: OUTPATIENT SERVICES | Facility: HOSPITAL | Age: 67
LOS: 1 days | Discharge: HOME | End: 2022-05-16

## 2022-05-16 ENCOUNTER — NON-APPOINTMENT (OUTPATIENT)
Age: 67
End: 2022-05-16

## 2022-05-16 ENCOUNTER — APPOINTMENT (OUTPATIENT)
Dept: INTERNAL MEDICINE | Facility: CLINIC | Age: 67
End: 2022-05-16
Payer: MEDICARE

## 2022-05-16 VITALS
TEMPERATURE: 97 F | HEART RATE: 89 BPM | DIASTOLIC BLOOD PRESSURE: 81 MMHG | BODY MASS INDEX: 30.32 KG/M2 | HEIGHT: 65 IN | SYSTOLIC BLOOD PRESSURE: 126 MMHG | OXYGEN SATURATION: 97 % | WEIGHT: 182 LBS

## 2022-05-16 DIAGNOSIS — E78.5 HYPERLIPIDEMIA, UNSPECIFIED: ICD-10-CM

## 2022-05-16 DIAGNOSIS — R73.03 PREDIABETES.: ICD-10-CM

## 2022-05-16 DIAGNOSIS — J45.909 UNSPECIFIED ASTHMA, UNCOMPLICATED: ICD-10-CM

## 2022-05-16 LAB
25(OH)D3 SERPL-MCNC: 25 NG/ML
ALBUMIN SERPL ELPH-MCNC: 4.5 G/DL
ALP BLD-CCNC: 65 U/L
ALT SERPL-CCNC: 11 U/L
ANION GAP SERPL CALC-SCNC: 15 MMOL/L
APPEARANCE: CLEAR
AST SERPL-CCNC: 13 U/L
BASOPHILS # BLD AUTO: 0.05 K/UL
BASOPHILS NFR BLD AUTO: 0.5 %
BILIRUB SERPL-MCNC: 0.7 MG/DL
BILIRUBIN URINE: NEGATIVE
BLOOD URINE: NEGATIVE
BUN SERPL-MCNC: 18 MG/DL
CALCIUM SERPL-MCNC: 9.3 MG/DL
CHLORIDE SERPL-SCNC: 103 MMOL/L
CHOLEST SERPL-MCNC: 263 MG/DL
CO2 SERPL-SCNC: 24 MMOL/L
COLOR: YELLOW
CREAT SERPL-MCNC: 0.9 MG/DL
CREAT SPEC-SCNC: 228 MG/DL
EGFR: 70 ML/MIN/1.73M2
EOSINOPHIL # BLD AUTO: 0.25 K/UL
EOSINOPHIL NFR BLD AUTO: 2.7 %
ESTIMATED AVERAGE GLUCOSE: 131 MG/DL
GLUCOSE QUALITATIVE U: NEGATIVE
GLUCOSE SERPL-MCNC: 96 MG/DL
HBA1C MFR BLD HPLC: 6.2 %
HCT VFR BLD CALC: 38.1 %
HDLC SERPL-MCNC: 69 MG/DL
HGB BLD-MCNC: 12.1 G/DL
IMM GRANULOCYTES NFR BLD AUTO: 0.2 %
KETONES URINE: NEGATIVE
LDLC SERPL CALC-MCNC: 148 MG/DL
LEUKOCYTE ESTERASE URINE: NEGATIVE
LYMPHOCYTES # BLD AUTO: 2.66 K/UL
LYMPHOCYTES NFR BLD AUTO: 29 %
MAN DIFF?: NORMAL
MCHC RBC-ENTMCNC: 28.3 PG
MCHC RBC-ENTMCNC: 31.8 G/DL
MCV RBC AUTO: 89 FL
MICROALBUMIN 24H UR DL<=1MG/L-MCNC: <1.2 MG/DL
MICROALBUMIN/CREAT 24H UR-RTO: NORMAL MG/G
MONOCYTES # BLD AUTO: 0.76 K/UL
MONOCYTES NFR BLD AUTO: 8.3 %
NEUTROPHILS # BLD AUTO: 5.44 K/UL
NEUTROPHILS NFR BLD AUTO: 59.3 %
NITRITE URINE: NEGATIVE
NONHDLC SERPL-MCNC: 194 MG/DL
PH URINE: 5.5
PLATELET # BLD AUTO: 314 K/UL
POTASSIUM SERPL-SCNC: 4.1 MMOL/L
PROT SERPL-MCNC: 6.9 G/DL
PROTEIN URINE: NORMAL
RBC # BLD: 4.28 M/UL
RBC # FLD: 13.5 %
SODIUM SERPL-SCNC: 142 MMOL/L
SPECIFIC GRAVITY URINE: 1.03
TRIGL SERPL-MCNC: 229 MG/DL
TSH SERPL-ACNC: 3.08 UIU/ML
UROBILINOGEN URINE: NORMAL
WBC # FLD AUTO: 9.18 K/UL

## 2022-05-16 PROCEDURE — 99213 OFFICE O/P EST LOW 20 MIN: CPT | Mod: GC

## 2022-05-16 RX ORDER — PRAVASTATIN SODIUM 40 MG/1
40 TABLET ORAL
Qty: 1 | Refills: 6 | Status: COMPLETED | COMMUNITY
Start: 2018-07-24 | End: 2022-05-16

## 2022-05-18 NOTE — HISTORY OF PRESENT ILLNESS
[FreeTextEntry1] : Follow up [de-identified] : 65 yo with history of depression and hyperlipidemia came for the follow up. No complaints currently

## 2022-05-18 NOTE — ASSESSMENT
[FreeTextEntry1] : 68 yo female with H/o depression and hyperlipidemia presented for the follow up.\par \par She is compliant with low cholesterol diet, is on atorvastatin 40 mg.\par \par Labs reviewed.\par Change Atorvastatin to Rosuvastatin\par Low cholesterol diet\par

## 2022-05-23 ENCOUNTER — APPOINTMENT (OUTPATIENT)
Dept: INTERNAL MEDICINE | Facility: CLINIC | Age: 67
End: 2022-05-23

## 2022-05-23 DIAGNOSIS — R73.03 PREDIABETES: ICD-10-CM

## 2022-05-23 DIAGNOSIS — J45.909 UNSPECIFIED ASTHMA, UNCOMPLICATED: ICD-10-CM

## 2022-05-23 DIAGNOSIS — E78.5 HYPERLIPIDEMIA, UNSPECIFIED: ICD-10-CM

## 2022-06-08 ENCOUNTER — NON-APPOINTMENT (OUTPATIENT)
Age: 67
End: 2022-06-08

## 2022-06-08 ENCOUNTER — APPOINTMENT (OUTPATIENT)
Dept: INTERNAL MEDICINE | Facility: CLINIC | Age: 67
End: 2022-06-08

## 2022-06-08 ENCOUNTER — OUTPATIENT (OUTPATIENT)
Dept: OUTPATIENT SERVICES | Facility: HOSPITAL | Age: 67
LOS: 1 days | Discharge: HOME | End: 2022-06-08

## 2022-06-08 DIAGNOSIS — Z23 ENCOUNTER FOR IMMUNIZATION: ICD-10-CM

## 2022-06-08 PROCEDURE — ZZZZZ: CPT

## 2022-06-08 NOTE — HISTORY OF PRESENT ILLNESS
[FreeTextEntry1] : Pain in the arm. Patient is here to get Covid booster. [de-identified] : 68 yo with history of depression and hyperlipidemia came for the follow up. C/o pain in the left forearm after lifting heavy grocery bag.\par Normal ROM.

## 2022-06-08 NOTE — ASSESSMENT
[FreeTextEntry1] : 66 yo female with H/o depression and hyperlipidemia presented for the follow up.\par \par She is compliant with low cholesterol diet, is on atorvastatin 40 mg.\par Left shoulder pain - Ibuprofen 400 mg twice a day for 5 days\par \par Labs reviewed.\par Covid booster given\par

## 2022-06-23 DIAGNOSIS — E78.5 HYPERLIPIDEMIA, UNSPECIFIED: ICD-10-CM

## 2022-06-23 DIAGNOSIS — F32.A DEPRESSION, UNSPECIFIED: ICD-10-CM

## 2022-06-23 DIAGNOSIS — M89.8X9 OTHER SPECIFIED DISORDERS OF BONE, UNSPECIFIED SITE: ICD-10-CM

## 2022-09-27 ENCOUNTER — APPOINTMENT (OUTPATIENT)
Dept: OBGYN | Facility: CLINIC | Age: 67
End: 2022-09-27

## 2022-09-27 ENCOUNTER — OUTPATIENT (OUTPATIENT)
Dept: OUTPATIENT SERVICES | Facility: HOSPITAL | Age: 67
LOS: 1 days | Discharge: HOME | End: 2022-09-27

## 2022-09-27 VITALS
SYSTOLIC BLOOD PRESSURE: 120 MMHG | BODY MASS INDEX: 29.82 KG/M2 | WEIGHT: 179 LBS | DIASTOLIC BLOOD PRESSURE: 80 MMHG | HEIGHT: 65 IN

## 2022-09-27 DIAGNOSIS — Z01.419 ENCOUNTER FOR GYNECOLOGICAL EXAMINATION (GENERAL) (ROUTINE) W/OUT ABNORMAL FINDINGS: ICD-10-CM

## 2022-09-27 PROCEDURE — 99387 INIT PM E/M NEW PAT 65+ YRS: CPT | Mod: GY,GC

## 2022-09-27 PROCEDURE — G0101: CPT

## 2022-09-30 LAB — HPV HIGH+LOW RISK DNA PNL CVX: NOT DETECTED

## 2022-10-05 LAB — CYTOLOGY CVX/VAG DOC THIN PREP: ABNORMAL

## 2023-02-08 ENCOUNTER — EMERGENCY (EMERGENCY)
Facility: HOSPITAL | Age: 68
LOS: 0 days | Discharge: HOME | End: 2023-02-08
Attending: EMERGENCY MEDICINE
Payer: MEDICARE

## 2023-02-08 VITALS
OXYGEN SATURATION: 100 % | DIASTOLIC BLOOD PRESSURE: 75 MMHG | SYSTOLIC BLOOD PRESSURE: 135 MMHG | WEIGHT: 179.9 LBS | TEMPERATURE: 97 F | RESPIRATION RATE: 18 BRPM | HEART RATE: 97 BPM

## 2023-02-08 DIAGNOSIS — E78.00 PURE HYPERCHOLESTEROLEMIA, UNSPECIFIED: ICD-10-CM

## 2023-02-08 DIAGNOSIS — S60.552A SUPERFICIAL FOREIGN BODY OF LEFT HAND, INITIAL ENCOUNTER: ICD-10-CM
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
## 2023-02-08 DIAGNOSIS — Z88.5 ALLERGY STATUS TO NARCOTIC AGENT: ICD-10-CM

## 2023-02-08 DIAGNOSIS — F32.A DEPRESSION, UNSPECIFIED: ICD-10-CM

## 2023-02-08 DIAGNOSIS — J45.909 UNSPECIFIED ASTHMA, UNCOMPLICATED: ICD-10-CM

## 2023-02-08 DIAGNOSIS — Z23 ENCOUNTER FOR IMMUNIZATION: ICD-10-CM
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
## 2023-02-08 PROCEDURE — 99284 EMERGENCY DEPT VISIT MOD MDM: CPT | Mod: FS

## 2023-02-08 PROCEDURE — 99283 EMERGENCY DEPT VISIT LOW MDM: CPT | Mod: 25

## 2023-02-08 PROCEDURE — 90471 IMMUNIZATION ADMIN: CPT

## 2023-02-08 PROCEDURE — 90715 TDAP VACCINE 7 YRS/> IM: CPT

## 2023-02-08 RX ORDER — TETANUS TOXOID, REDUCED DIPHTHERIA TOXOID AND ACELLULAR PERTUSSIS VACCINE, ADSORBED 5; 2.5; 8; 8; 2.5 [IU]/.5ML; [IU]/.5ML; UG/.5ML; UG/.5ML; UG/.5ML
0.5 SUSPENSION INTRAMUSCULAR ONCE
Refills: 0 | Status: COMPLETED | OUTPATIENT
Start: 2023-02-08 | End: 2023-02-08

## 2023-02-08 RX ORDER — CEPHALEXIN 500 MG
1 CAPSULE ORAL
Qty: 28 | Refills: 0
Start: 2023-02-08 | End: 2023-02-14

## 2023-02-08 RX ADMIN — TETANUS TOXOID, REDUCED DIPHTHERIA TOXOID AND ACELLULAR PERTUSSIS VACCINE, ADSORBED 0.5 MILLILITER(S): 5; 2.5; 8; 8; 2.5 SUSPENSION INTRAMUSCULAR at 15:42

## 2023-02-08 NOTE — ED PROVIDER NOTE - IV ALTEPLASE ADMIN OUTSIDE HIDDEN
----- Message from Scott Tripathi DO sent at 1/25/2019  8:09 AM CST -----  Negative for blood clots   show

## 2023-02-08 NOTE — ED ADULT NURSE NOTE - NSIMPLEMENTINTERV_GEN_ALL_ED
Implemented All Universal Safety Interventions:  [employer] to call system. Call bell, personal items and telephone within reach. Instruct patient to call for assistance. Room bathroom lighting operational. Non-slip footwear when patient is off stretcher. Physically safe environment: no spills, clutter or unnecessary equipment. Stretcher in lowest position, wheels locked, appropriate side rails in place.

## 2023-02-08 NOTE — ED PROVIDER NOTE - PATIENT PORTAL LINK FT
You can access the FollowMyHealth Patient Portal offered by [facility] by registering at the following website: [email or web address]. By joining [employer]’s FollowMyHealth portal, you will also be able to view your health information using other applications (apps) compatible with our system.

## 2023-02-08 NOTE — ED PROVIDER NOTE - CLINICAL SUMMARY MEDICAL DECISION MAKING FREE TEXT BOX
67-year-old female with h/o HLD, asthma, depression, in ER for evaluation of splinter stuck in L hand, now infected.  Patient states she got a splinter into her hand ~5 days ago, area now with some swelling and had some purulent drainage.  No F/C.  No weakness, no dizziness, no CP/SOB.  No N/B.  No other complaints.  PE - RUE: from, no swelling to upper arm/forearm/wrist/hand, + area of focal swelling to palmar surface of hand, + splinter FB, no streaking erythema, 2+ radial pulse, cap refill < 2 secs, sensation intact.  -I&D performed in ER.  to DC home on p.o. antibiotics.  Patient instructed on continued wound care at home, to follow-up with PMD.  Told to return to ER for increased swelling, pain, fever, or any other new/concerning symptoms.  Patient understands and agrees with plan.

## 2023-02-08 NOTE — ED PROCEDURE NOTE - NS ED ATTENDING STATEMENT MOD
This was a shared visit with the JEANNIE. I reviewed and verified the documentation and independently performed the documented:

## 2023-02-08 NOTE — ED PROVIDER NOTE - NSICDXFAMILYHX_GEN_ALL_CORE_FT
FAMILY HISTORY:  Father  Still living? Unknown  FH: CAD (coronary artery disease), Age at diagnosis: Age Unknown  FHx: colon cancer, Age at diagnosis: Age Unknown

## 2023-02-08 NOTE — ED ADULT TRIAGE NOTE - GLASGOW COMA SCALE: SCORE, MLM
Non MI troponin elevation, trop 0 11 secondary to rapid afib and covid infection  Trop peaked at 0 15  EKG - Rapid Afib intially now NSR 15

## 2023-02-08 NOTE — ED PROVIDER NOTE - NSFOLLOWUPINSTRUCTIONS_ED_ALL_ED_FT
Skin Foreign Body  A skin foreign body is an object that is stuck in the skin. Common objects that get stuck in the skin include:    Wood (splinter).  Glass.  Rock.  Nails.  Needles.  Thorns or cactus spines.  Fiberglass slivers.  Fish hooks.  BBs.    Foreign bodies may damage tissue or cause infection. If the foreign body does not cause any pain or infection, it may be okay to leave it in the skin.    What are the causes?  This condition is caused by an object getting lodged under the skin, usually by accident. Children may get a skin foreign body while playing outside. Adults may get a skin foreign body after breaking glass or while working with wood, fiberglass, or stone material. In some cases, the object may get stuck in an open wound after an injury.    What are the signs or symptoms?  Symptoms of this condition include:    Pain.  A feeling of something being stuck under the skin.    How is this diagnosed?  This condition is diagnosed based on:    Your medical history and symptoms.  A physical exam.  Imaging tests, such as:    X-rays.  CT scans.  Ultrasounds.      How is this treated?  Treatment for this condition depends on what the foreign body is, where it is, and whether it is causing infection or other symptoms. Treatment may involve:    Removing all or part of the object with a needle and metal tweezers. In some cases, an incision may be made in the skin to allow access to the object.  Waiting to remove the object until it moves closer to the surface of the skin. This may take several days.  Leaving the object in place. This may be done if the object is not causing any symptoms or if removal will cause more damage to the skin or tissue.  Antibiotic pills or ointment to treat or prevent infection.    Follow these instructions at home:  Wound or incision care     If the foreign body was removed, follow instructions from your health care provider about how to take care of your wound or incision. Make sure you:    Wash your hands with soap and water before you change your bandage (dressing). If soap and water are not available, use hand .  Change your dressing as told by your health care provider.  Leave stitches (sutures), skin glue, or adhesive strips in place. These skin closures may need to stay in place for 2 weeks or longer. If adhesive strip edges start to loosen and curl up, you may trim the loose edges. Do not remove adhesive strips completely unless your health care provider tells you to do that.    ImageCheck your wound or incision every day for signs of infection. This is especially important if the foreign body was left in place in the skin. Check for:    Redness, swelling, or pain.  Fluid or blood.  Pus or a bad smell.  Warmth.    General instructions     Take over-the-counter and prescription medicines only as told by your health care provider.  If you were prescribed an antibiotic medicine or ointment, use it as told by your health care provider. Do not stop using the antibiotic even if you start to feel better.  Keep all follow-up visits as told by your health care provider. This is important.  Contact a health care provider if:  You develop more pain or other new symptoms around the area where the object entered the skin.  You have redness, swelling, or pain around your wound or incision.  You have fluid or blood coming from your wound or incision.  Your wound or incision feels warm to the touch.  You have pus or a bad smell coming from your wound or incision.  You have a fever.  Get help right away if:  You have severe pain that does not get better with medicine.  Summary  A skin foreign body is an object that is stuck in the skin. Common objects that get stuck in the skin include wood, glass, rock, thorns, and fiberglass slivers.  Treatment for this condition depends on what the foreign body is, where it is, and whether it is causing infection or other symptoms.  Treatment may include removing the foreign body or leaving it in place. It is important to watch the wound or incision for signs of infection, especially if the object was left in place in the skin.  This information is not intended to replace advice given to you by your health care provider. Make sure you discuss any questions you have with your health care provider.

## 2023-05-08 NOTE — ED ADULT NURSE NOTE - CAS DISCH CONDITION
Patient returned call. Writer updated patient with the following lab results and recommendations per Dr. Wharton. Informed patient that the cardiology office is still waiting for a price check on the Entresto as discussed at the time of his office visit. Patient verbalizes understanding with no further questions or concerns at this time.    Stable

## 2023-05-17 ENCOUNTER — RX RENEWAL (OUTPATIENT)
Age: 68
End: 2023-05-17

## 2023-09-12 ENCOUNTER — EMERGENCY (EMERGENCY)
Facility: HOSPITAL | Age: 68
LOS: 0 days | Discharge: HOME | End: 2023-09-12
Attending: STUDENT IN AN ORGANIZED HEALTH CARE EDUCATION/TRAINING PROGRAM
Payer: MEDICARE

## 2023-09-12 VITALS
SYSTOLIC BLOOD PRESSURE: 130 MMHG | DIASTOLIC BLOOD PRESSURE: 77 MMHG | WEIGHT: 179.9 LBS | RESPIRATION RATE: 17 BRPM | TEMPERATURE: 98 F | HEART RATE: 85 BPM | OXYGEN SATURATION: 98 %

## 2023-09-12 DIAGNOSIS — E78.5 HYPERLIPIDEMIA, UNSPECIFIED: ICD-10-CM

## 2023-09-12 DIAGNOSIS — F32.A DEPRESSION, UNSPECIFIED: ICD-10-CM

## 2023-09-12 DIAGNOSIS — L01.00 IMPETIGO, UNSPECIFIED: ICD-10-CM

## 2023-09-12 DIAGNOSIS — R21 RASH AND OTHER NONSPECIFIC SKIN ERUPTION: ICD-10-CM

## 2023-09-12 DIAGNOSIS — Z88.5 ALLERGY STATUS TO NARCOTIC AGENT: ICD-10-CM

## 2023-09-12 PROCEDURE — 99283 EMERGENCY DEPT VISIT LOW MDM: CPT | Mod: GC

## 2023-09-12 PROCEDURE — 99283 EMERGENCY DEPT VISIT LOW MDM: CPT

## 2023-09-12 RX ORDER — CEPHALEXIN 500 MG
1 CAPSULE ORAL
Qty: 30 | Refills: 0
Start: 2023-09-12 | End: 2023-09-21

## 2023-09-12 RX ORDER — MUPIROCIN 20 MG/G
1 OINTMENT TOPICAL
Qty: 1 | Refills: 0
Start: 2023-09-12 | End: 2023-09-16

## 2023-09-12 NOTE — ED PROVIDER NOTE - PROGRESS NOTE DETAILS
Resident BHARTI Garcia: Discussed management and care with patient, she is comfortable with discharge. Return precautions given.

## 2023-09-12 NOTE — ED PROVIDER NOTE - NSFOLLOWUPINSTRUCTIONS_ED_ALL_ED_FT
You were evaluated in the Emergency Department today, and your visit did not reveal anything immediately life-threatening.    However, it is important that you follow-up with your PRIMARY CARE PHYSICIAN within 3-5 days.  ------------------------------------------------------------------------------------------------------------------------  Impetigo    Impetigo is an infection of the skin. It commonly occurs in young children, but it can also occur in adults. The infection causes itchy blisters and sores that produce brownish-yellow fluid. As the fluid dries, it forms a thick, honey-colored crust. These skin changes usually occur on the face, but they can also affect other areas of the body. Impetigo usually goes away in 7–10 days with treatment.    What are the causes?  This condition is caused by two types of bacteria. It may be caused by staphylococci or streptococci bacteria. These bacteria cause impetigo when they get under the surface of the skin. This often happens after some damage to the skin, such as:  Cuts, scrapes, or scratches.  Rashes.  Insect bites, especially when you scratch the area of a bite.  Chickenpox or other illnesses that cause open skin sores.  Nail biting or chewing.  Impetigo can spread easily from one person to another (is contagious). It may be spread through close skin contact or by sharing towels, clothing, or other items that an infected person has touched.    Scratching the affected area can cause impetigo to spread to other parts of the body. The bacteria can get under your fingernails and spread when you touch another area of your skin.    What increases the risk?  The following factors may make you more likely to develop this condition:  Playing sports that include skin-to-skin contact with others.  Having broken skin, such as from a cut or scrape.  Living in an area that has high humidity levels.  Having poor hygiene.  Having high levels of staphylococci in your nose.  Having a condition that weakens the skin integrity, such as:  Having a weak body defense system (immune system).  Having a skin condition with open sores, such as chickenpox.  Having diabetes.    What are the signs or symptoms?  The main symptom of this condition is small blisters, often on the face around the mouth and nose. In time, the blisters break open and turn into tiny sores (lesions) with a yellow crust. In some cases, the blisters cause itching or burning. Scratching, irritation, or lack of treatment may cause these small lesions to get larger.    Other possible symptoms include:  Larger blisters.  Pus.  Swollen lymph glands.      How is this treated?  Treatment for this condition depends on the severity of the condition:  Mild impetigo can be treated with prescription antibiotic cream.  Oral antibiotic medicine may be used in more severe cases.  Medicines that reduce itchiness (antihistamines)may also be used.    Follow these instructions at home:  Medicines  Take over-the-counter and prescription medicines only as told by your health care provider.  Apply or take your antibiotic as told by your health care provider. Do not stop using the antibiotic even if your condition improves.    Before applying antibiotic cream or ointment, you should:  Gently wash the infected areas with antibacterial soap and warm water.  Soak crusted areas in warm, soapy water using antibacterial soap.  Gently rub the areas to remove crusts. Do not scrub.    Preventing the spread of infection  To help prevent impetigo from spreading to other body areas:  Keep your fingernails short and clean.  Do not scratch the blisters or sores.  Cover infected areas, if necessary, to keep from scratching.  Wash your hands often with soap and warm water.    To help prevent impetigo from spreading to other people:  Do not share towels.  Wash your clothing and bedsheets in water that is 140°F (60°C) or warmer.  Stay home until you have used an antibiotic cream for 48 hours (2 days) or an oral antibiotic medicine for 24 hours (1 day).  You should only return to work and activities with other people if your skin shows significant improvement.  You may return to contact sports after you have used antibiotic medicine for 72 hours (3 days).    General instructions  Keep all follow-up visits. This is important.    How is this prevented?  Wash your hands often with soap and warm water.  Do not share towels, washcloths, clothing, bedding, or razors.  Keep your fingernails short.  Keep any cuts, scrapes, bug bites, or rashes clean and covered.  Use insect repellent to prevent bug bites.    Contact a health care provider if:  You develop more blisters or sores, even with treatment.  Other family members get sores.  Your skin sores are not improving after 72 hours (3 days) of treatment.    You have a fever.  Get help right away if:  You see spreading redness or swelling of the skin around your sores.  You develop a sore throat.  The area around your rash becomes warm, red, or tender to the touch.  You have dark, reddish-brown urine.  You do not urinate often or you urinate small amounts.  You are very tired (lethargic).  You have swelling in your face, hands, or feet.    Summary  Impetigo is a skin infection that causes itchy blisters and sores that produce brownish-yellow fluid. As the fluid dries, it forms a crust.  This condition is caused by staphylococci or streptococci bacteria. These bacteria cause impetigo when they get under the surface of the skin, such as through cuts, rashes, bug bites, or open sores.  Treatment for this condition may include antibiotic ointment or oral antibiotics.  To help prevent impetigo from spreading to other body areas, make sure you keep your fingernails short, avoid scratching, cover any blisters, and wash your hands often.  If you have impetigo, stay home until you have used an antibiotic cream for 48 hours (2 days) or an oral antibiotic medicine for 24 hours (1 day). You should only return to work and activities with other people if your skin shows significant improvement.

## 2023-09-12 NOTE — ED PROVIDER NOTE - OBJECTIVE STATEMENT
Patient is a 68-year-old woman with a history of depression and hyperlipidemia, presenting for a rash.  Patient states she got very sweaty sometime in the middle of the week when it was very hot outside, and was scratching her upper chest.  She subsequently developed maculopapular blanching erythematous rash on her chest, with honey crusted portions, additional patch under the left lower lip, and on bilateral lower extremities. No fever, blisters, mucosal involvement, dysuria or dyschezia. No chest tightness, angioedema, abdominal pain, vomiting or diarrhea.

## 2023-09-12 NOTE — ED PROVIDER NOTE - PHYSICAL EXAMINATION
_  CONSTITUTIONAL: NAD  SKIN: Warm, dry; +maculopapular blanching erythematous rash on upper chest, with honey crusted portions, additional patch under the left lower lip, and on bilateral lower extremities; no blisters  HEAD: NCAT  EYES: Clear conjunctiva   ENT: MMM  NECK: Supple  CARD: No JVD, no cyanosis  RESP: Speaking in full sentences; symmetric rise and fall of chest  EXT: Pulses palpable distally  NEURO: Grossly intact  PSYCH: Cooperative, appropriate

## 2023-09-12 NOTE — ED ADULT NURSE NOTE - SKIN TEMPERATURE MOISTURE
Results reported to patient--kidney function slowly going down, pt. aware, agrees to f/u with doctor about it  Pt. reports feeling better after meds  pt. agrees to f/u with primary care outpt.  pt. understands to return to ED if symptoms worsen; will d/c
warm

## 2023-09-12 NOTE — ED PROVIDER NOTE - CLINICAL SUMMARY MEDICAL DECISION MAKING FREE TEXT BOX
68-year-old woman with a history of depression and hyperlipidemia, presenting for a rash. Exam concerning for superimposed impetigo on scratches to chest and chin. otherwise unremarkable. afebrile. Will treat with oral antibiotics as well as topical mupirocin.  Return precautions sabrina in detail with the patient, follow-up with PCP this week.

## 2023-09-12 NOTE — ED PROVIDER NOTE - PATIENT PORTAL LINK FT
You can access the FollowMyHealth Patient Portal offered by [facility] by registering at the following website: [email or web address]. By joining [employer]’s FollowMyHealth portal, you will also be able to view your health information using other applications (apps) compatible with our system.

## 2023-09-12 NOTE — ED ADULT TRIAGE NOTE - RESPIRATORY RATE (BREATHS/MIN)
17

## 2023-09-20 NOTE — ED ADULT TRIAGE NOTE - PAIN RATING/NUMBER SCALE (0-10): ACTIVITY
1 (mild pain) Solaraze Counseling:  I discussed with the patient the risks of Solaraze including but not limited to erythema, scaling, itching, weeping, crusting, and pain.

## 2023-10-10 ENCOUNTER — APPOINTMENT (OUTPATIENT)
Dept: OBGYN | Facility: CLINIC | Age: 68
End: 2023-10-10

## 2024-04-29 ENCOUNTER — RX RENEWAL (OUTPATIENT)
Age: 69
End: 2024-04-29

## 2024-05-01 ENCOUNTER — RX RENEWAL (OUTPATIENT)
Age: 69
End: 2024-05-01

## 2024-05-20 ENCOUNTER — OUTPATIENT (OUTPATIENT)
Dept: OUTPATIENT SERVICES | Facility: HOSPITAL | Age: 69
LOS: 1 days | End: 2024-05-20
Payer: MEDICARE

## 2024-05-20 ENCOUNTER — APPOINTMENT (OUTPATIENT)
Dept: INTERNAL MEDICINE | Facility: CLINIC | Age: 69
End: 2024-05-20
Payer: MEDICARE

## 2024-05-20 VITALS
WEIGHT: 180 LBS | TEMPERATURE: 97.7 F | SYSTOLIC BLOOD PRESSURE: 129 MMHG | HEIGHT: 65 IN | DIASTOLIC BLOOD PRESSURE: 82 MMHG | BODY MASS INDEX: 29.99 KG/M2 | OXYGEN SATURATION: 97 % | HEART RATE: 83 BPM

## 2024-05-20 DIAGNOSIS — Z00.00 ENCOUNTER FOR GENERAL ADULT MEDICAL EXAMINATION WITHOUT ABNORMAL FINDINGS: ICD-10-CM

## 2024-05-20 DIAGNOSIS — Z00.00 ENCOUNTER FOR GENERAL ADULT MEDICAL EXAMINATION W/OUT ABNORMAL FINDINGS: ICD-10-CM

## 2024-05-20 DIAGNOSIS — F32.A DEPRESSION, UNSPECIFIED: ICD-10-CM

## 2024-05-20 DIAGNOSIS — M25.559 PAIN IN UNSPECIFIED HIP: ICD-10-CM

## 2024-05-20 DIAGNOSIS — J30.9 ALLERGIC RHINITIS, UNSPECIFIED: ICD-10-CM

## 2024-05-20 PROCEDURE — 99214 OFFICE O/P EST MOD 30 MIN: CPT

## 2024-05-20 RX ORDER — ROSUVASTATIN CALCIUM 40 MG/1
40 TABLET, FILM COATED ORAL DAILY
Qty: 90 | Refills: 2 | Status: ACTIVE | COMMUNITY
Start: 2022-05-16 | End: 1900-01-01

## 2024-05-20 RX ORDER — BUPROPION HYDROCHLORIDE 100 MG/1
100 TABLET, FILM COATED ORAL DAILY
Refills: 0 | Status: ACTIVE | COMMUNITY
Start: 2024-05-20

## 2024-05-20 RX ORDER — ALBUTEROL SULFATE 90 UG/1
108 (90 BASE) INHALANT RESPIRATORY (INHALATION)
Qty: 1 | Refills: 6 | Status: ACTIVE | COMMUNITY
Start: 2024-04-17 | End: 1900-01-01

## 2024-05-20 RX ORDER — LORATADINE 10 MG
5-120 TABLET ORAL
Qty: 60 | Refills: 3 | Status: ACTIVE | COMMUNITY
Start: 2024-05-20 | End: 1900-01-01

## 2024-05-20 NOTE — HISTORY OF PRESENT ILLNESS
[FreeTextEntry1] : routine follow up [de-identified] : 70 yo F with history of depression, preDM, and hyperlipidemia presenting for routine follow up. C/o pain in the left hip at this time.

## 2024-05-20 NOTE — REVIEW OF SYSTEMS
[Joint Pain] : joint pain [Fever] : no fever [Chills] : no chills [Recent Change In Weight] : ~T no recent weight change [Chest Pain] : no chest pain [Palpitations] : no palpitations [Lower Ext Edema] : no lower extremity edema [Shortness Of Breath] : no shortness of breath [Abdominal Pain] : no abdominal pain [Nausea] : no nausea [Constipation] : no constipation [Vomiting] : no vomiting [Dysuria] : no dysuria [Joint Stiffness] : no joint stiffness [Headache] : no headache [Dizziness] : no dizziness

## 2024-05-20 NOTE — PHYSICAL EXAM
[No Acute Distress] : no acute distress [Well Nourished] : well nourished [No Respiratory Distress] : no respiratory distress  [Clear to Auscultation] : lungs were clear to auscultation bilaterally [Normal Rate] : normal rate  [Regular Rhythm] : with a regular rhythm [Normal S1, S2] : normal S1 and S2 [No Edema] : there was no peripheral edema [Soft] : abdomen soft [Non Tender] : non-tender [Non-distended] : non-distended [No Joint Swelling] : no joint swelling [No Rash] : no rash

## 2024-06-06 ENCOUNTER — RESULT REVIEW (OUTPATIENT)
Age: 69
End: 2024-06-06

## 2024-06-06 ENCOUNTER — OUTPATIENT (OUTPATIENT)
Dept: OUTPATIENT SERVICES | Facility: HOSPITAL | Age: 69
LOS: 1 days | End: 2024-06-06
Payer: MEDICARE

## 2024-06-06 DIAGNOSIS — Z13.820 ENCOUNTER FOR SCREENING FOR OSTEOPOROSIS: ICD-10-CM

## 2024-06-06 DIAGNOSIS — Z12.31 ENCOUNTER FOR SCREENING MAMMOGRAM FOR MALIGNANT NEOPLASM OF BREAST: ICD-10-CM

## 2024-06-06 PROCEDURE — 77063 BREAST TOMOSYNTHESIS BI: CPT

## 2024-06-06 PROCEDURE — 77063 BREAST TOMOSYNTHESIS BI: CPT | Mod: 26

## 2024-06-06 PROCEDURE — 77067 SCR MAMMO BI INCL CAD: CPT | Mod: 26

## 2024-06-06 PROCEDURE — 77080 DXA BONE DENSITY AXIAL: CPT

## 2024-06-06 PROCEDURE — 77067 SCR MAMMO BI INCL CAD: CPT

## 2024-06-06 PROCEDURE — 77080 DXA BONE DENSITY AXIAL: CPT | Mod: 26

## 2024-06-07 DIAGNOSIS — Z12.31 ENCOUNTER FOR SCREENING MAMMOGRAM FOR MALIGNANT NEOPLASM OF BREAST: ICD-10-CM

## 2024-06-07 DIAGNOSIS — Z13.820 ENCOUNTER FOR SCREENING FOR OSTEOPOROSIS: ICD-10-CM

## 2024-06-10 DIAGNOSIS — M81.0 AGE-RELATED OSTEOPOROSIS WITHOUT CURRENT PATHOLOGICAL FRACTURE: ICD-10-CM

## 2024-06-11 ENCOUNTER — OUTPATIENT (OUTPATIENT)
Dept: OUTPATIENT SERVICES | Facility: HOSPITAL | Age: 69
LOS: 1 days | End: 2024-06-11
Payer: MEDICARE

## 2024-06-11 ENCOUNTER — LABORATORY RESULT (OUTPATIENT)
Age: 69
End: 2024-06-11

## 2024-06-11 DIAGNOSIS — M25.559 PAIN IN UNSPECIFIED HIP: ICD-10-CM

## 2024-06-11 LAB
HCT VFR BLD CALC: 38.2 %
HGB BLD-MCNC: 12.7 G/DL
MCHC RBC-ENTMCNC: 29.3 PG
MCHC RBC-ENTMCNC: 33.2 G/DL
MCV RBC AUTO: 88.2 FL
PLATELET # BLD AUTO: 277 K/UL
PMV BLD AUTO: 0 /100 WBCS
PMV BLD: 10.5 FL
RBC # BLD: 4.33 M/UL
RBC # FLD: 13.3 %
TSH SERPL-ACNC: 3.41 UIU/ML
WBC # FLD AUTO: 9.11 K/UL

## 2024-06-11 PROCEDURE — 81001 URINALYSIS AUTO W/SCOPE: CPT

## 2024-06-11 PROCEDURE — 85027 COMPLETE CBC AUTOMATED: CPT

## 2024-06-11 PROCEDURE — 83036 HEMOGLOBIN GLYCOSYLATED A1C: CPT

## 2024-06-11 PROCEDURE — 80053 COMPREHEN METABOLIC PANEL: CPT

## 2024-06-11 PROCEDURE — 84443 ASSAY THYROID STIM HORMONE: CPT

## 2024-06-11 PROCEDURE — 80061 LIPID PANEL: CPT

## 2024-06-12 DIAGNOSIS — M25.559 PAIN IN UNSPECIFIED HIP: ICD-10-CM

## 2024-06-12 LAB
ALBUMIN SERPL ELPH-MCNC: 4.6 G/DL
ALP BLD-CCNC: 63 U/L
ALT SERPL-CCNC: 16 U/L
ANION GAP SERPL CALC-SCNC: 15 MMOL/L
APPEARANCE: ABNORMAL
AST SERPL-CCNC: 16 U/L
BILIRUB SERPL-MCNC: 0.8 MG/DL
BILIRUBIN URINE: NEGATIVE
BLOOD URINE: ABNORMAL
BUN SERPL-MCNC: 20 MG/DL
CALCIUM SERPL-MCNC: 9.4 MG/DL
CHLORIDE SERPL-SCNC: 106 MMOL/L
CHOLEST SERPL-MCNC: 192 MG/DL
CO2 SERPL-SCNC: 24 MMOL/L
COLOR: YELLOW
CREAT SERPL-MCNC: 0.9 MG/DL
EGFR: 69 ML/MIN/1.73M2
ESTIMATED AVERAGE GLUCOSE: 143 MG/DL
GLUCOSE QUALITATIVE U: NEGATIVE MG/DL
GLUCOSE SERPL-MCNC: 122 MG/DL
HBA1C MFR BLD HPLC: 6.6 %
HDLC SERPL-MCNC: 67 MG/DL
KETONES URINE: NEGATIVE MG/DL
LDLC SERPL CALC-MCNC: 99 MG/DL
LEUKOCYTE ESTERASE URINE: NEGATIVE
NITRITE URINE: NEGATIVE
NONHDLC SERPL-MCNC: 125 MG/DL
PH URINE: 5.5
POTASSIUM SERPL-SCNC: 4.4 MMOL/L
PROT SERPL-MCNC: 6.9 G/DL
PROTEIN URINE: 30 MG/DL
SODIUM SERPL-SCNC: 145 MMOL/L
SPECIFIC GRAVITY URINE: 1.03
TRIGL SERPL-MCNC: 130 MG/DL
UROBILINOGEN URINE: 0.2 MG/DL

## 2024-06-14 NOTE — H&P PST ADULT - NSICDXPASTSURGICALHX_GEN_ALL_CORE_FT
June 14, 2024       Mariza Loo MD  [address] [address]  Via In Basket      Patient: Dylon Sousa   YOB: 2010   Date of Visit: 6/14/2024       Dear Dr. Loo:    I saw your patient, Dylon Sousa, for an evaluation. Below are my notes for this visit with him.    If you have questions, please do not hesitate to call me.      Sincerely,        Nataly Boudreaux MD        CC: No Recipients  Nataly Boudreaux MD  6/14/2024  2:08 PM  Signed  Pediatric Endocrinology Diabetes Visit Note    Interval History (HPI):  Dylon is a 14 year old with Type 1 diabetes.    Date of diagnosis: 07/2012    Questions/Concerns:  Patient arrives to the visit with mom. Mom states that highs overnight have gotten a lot better. Patient is still playing soccer and has not been leaving the pump on during practice, but he is suspending the insulin. No specific questions or concerns for today's visit.     Patient  verbalized the following mealtimes:     Breakfast - 0715a  Lunch - 12-2p   PM Snack - sometimes, but no specific times  Dinner - 2030   Bedtime Snack - [id number]    Illness since last visit:  none    Refills:  lispo vials, test strips, ketone strips    CSII:  Pump/Pod: Tandem tslim  Who gives boluses: patient does all injections or boluses   When do you give boluses: before meals and snacks   How many boluses do you miss a week:0  Pump site locations:abdomen  Rotating pump sites: yes  Problems with pump sites or pump: No   Do you have dylan to upload your pump to the cloud: Yes  Site change every 3 days  Active insulin time: 2 hours    Pump upload attached to visit: yes    Total daily dose: 52.31 units/day  Total basal dose: 21.10 units   Percent basal: 40%   Units/kg/day of insulin: 0.84    Dylon Sousa     Pump Instructions    Pump type  T Slim   Rapid acting insulin Humalog   BASAL RATE    Time U/Hr   0000 0.9   0400 0.8   0700 0.7   0900 0.35   1030 0.4   1130 0.4   1500 0.65    1700 0.65   2000 0.8   2200 0.9   INSULIN TO CARB RATIO   Time 1 u/ __ gm    0000 1 u/ 7 gm    0400 1 u/ 7 gm    0700 1 u/ 6 gm    0900 1 u/ 5 gm    1030  1 u/ 14 gm    1130 1 u/ 6 gm    1500 1 u/ 6 gm   1700 1 u/ 10 gm   2000 1 u/ 12 gm   2200 1 u/ 14 gm   CORRECTION FACTOR (CF)   Time  CF   0000 35   0400 45   0700 40   0900 30   1030 45   1130 40   1500 30   1700 35   2000 35   2200 35   TARGET BLOOD SUGAR   Time __ mg/dL   0000 120 mg/dL     Blood Glucose range/average: see attached log and/or CGM report    Glucose Monitoring:  Meter brand or model:contour next  How many BG checks per day: 0-1    CGM: yes, Dexcom G6  Do you manually check BG? Yes, when? To verify BG readings on dexcom      Hypoglycemia:  Frequency per week (less than 80): 2-4  How many of those are overnight: 0  Hypoglycemia awareness: Yes  Hypoglycemia symptoms: Nervousness/Anxiety and Tremors   Glucagon at home and school: Yes  Use glucagon since last visit: No    Ketones:  Do you check for ketones: Checks  Ketones since last visit: negative  Method: urine ketone strips    Safety:   Hospitalizations since last visit: No  Medical Alert ID: Yes    Nutrition:   Do you eat regular meals: breakfast, lunch, and dinner  Do you snack: afternoon and bedtime  Carb counting concerns: No    Other:   Job, sports or extracurricular activities: Yes, soccer daily  Problems related to diabetes at school: No     PAST MEDICAL HISTORY:  Past Medical History:   Diagnosis Date   • ADHD (attention deficit hyperactivity disorder)    • Diabetes mellitus (CMD)        PAST SURGICAL HISTORY:  No past surgical history on file.    FAMILY HISTORY:  Family History   Problem Relation Age of Onset   • Thyroid Maternal Grandmother    • Diabetes Maternal Grandmother        SOCIAL HISTORY:  Social History     Tobacco Use   • Smoking status: Never   • Smokeless tobacco: Never     Social History     Social History Narrative    Lives at home with dad, mom and sister    In 9th  grade    Very active in soccer        Review of Systems   Constitutional:  Negative for activity change, appetite change and unexpected weight change.   HENT:  Negative for hearing loss, sore throat and trouble swallowing.    Eyes:  Negative for visual disturbance.   Respiratory:  Negative for cough and shortness of breath.    Cardiovascular:  Negative for chest pain and palpitations.   Gastrointestinal:  Negative for abdominal pain, constipation, diarrhea and vomiting.   Endocrine: Negative for cold intolerance and heat intolerance.   Genitourinary:         No increased urination   Musculoskeletal:  Negative for myalgias.   Skin:  Negative for rash.   Neurological:  Negative for dizziness and headaches.   Hematological:  Does not bruise/bleed easily.   Psychiatric/Behavioral:  Negative for sleep disturbance.        PHYSICAL EXAMINATION  Visit Vitals  /77   Pulse 87   Ht 5' 11.54\" (1.817 m)   Wt 62.2 kg (137 lb 2 oz)   BMI 18.84 kg/m²       70 %ile (Z= 0.53) based on [place] (Boys, 2-20 Years) weight-for-age data using vitals from 6/14/2024.  94 %ile (Z= 1.56) based on CDC (Boys, 2-20 Years) Stature-for-age data based on Stature recorded on 6/14/2024.  Body surface area is 1.81 meters squared.  Blood pressure reading is in the normal blood pressure range based on the 2017 AAP Clinical Practice Guideline.  BMI: 34.3 %ile (Z= -0.40) based on CDC (Boys, 2-20 Years) BMI-for-age based on BMI available as of 6/14/2024.    Physical Exam  Constitutional:       Appearance: He is well-developed.   HENT:      Head: Normocephalic and atraumatic.   Eyes:      Pupils: Pupils are equal, round, and reactive to light.   Neck:      Thyroid: No thyroid mass or thyromegaly.   Cardiovascular:      Rate and Rhythm: Normal rate and regular rhythm.   Pulmonary:      Effort: No respiratory distress.      Breath sounds: Normal breath sounds.   Abdominal:      General: Bowel sounds are normal.      Palpations: Abdomen is soft.       Tenderness: There is no abdominal tenderness.   Musculoskeletal:         General: Normal range of motion.      Cervical back: Normal range of motion and neck supple.   Skin:     General: Skin is warm and dry.      Findings: No rash.   Neurological:      Mental Status: He is alert and oriented to person, place, and time.      Deep Tendon Reflexes: Reflexes are normal and symmetric.         Lab and Imaging Results  Hemoglobin A1C:   Results for orders placed or performed in visit on 06/14/24   POCT Glycohemoglobin Analyzer   Result Value Ref Range    Hemoglobin A1C, POC 7.4 (A) 4.5 - 5.6 %         Recent Results (from the past 8400 hour(s))   POCT Glycohemoglobin Analyzer    Collection Time: 12/07/23  8:37 AM   Result Value Ref Range    Hemoglobin A1C, POC 8.3 (A) 4.5 - 5.6 %   DIABETIC RETINAVUE EYE EXAM    Collection Time: 02/22/24 12:00 AM   Result Value Ref Range    Retinopathy Present No    POCT Glycohemoglobin Analyzer    Collection Time: 03/15/24  8:27 AM   Result Value Ref Range    Hemoglobin A1C, POC 8.1 (A) 4.5 - 5.6 %   Celiac Disease Screen, 2y And Over    Collection Time: 03/15/24  9:18 AM   Result Value Ref Range    Tissue Transglutaminase Antibody, IgA 1 <4 U/ml    Immunoglobulin A 128 44 - 441 mg/dL   Lipid Panel Without Reflex    Collection Time: 03/15/24  9:18 AM   Result Value Ref Range    Cholesterol 125 <=169 mg/dL    Triglycerides 60 <=89 mg/dL    HDL 55 >=46 mg/dL    LDL 58 <=109 mg/dL    Non-HDL Cholesterol 70 <=119 mg/dL    Cholesterol/ HDL Ratio 2.3 <=4.4   Free T4    Collection Time: 03/15/24  9:18 AM   Result Value Ref Range    T4, Free 1.1 0.8 - 1.3 ng/dL   Thyroid Stimulating Hormone    Collection Time: 03/15/24  9:18 AM   Result Value Ref Range    TSH 1.482 0.463 - 4.130 mcUnits/mL   Microalbumin Urine Random    Collection Time: 03/15/24  9:22 AM   Result Value Ref Range    Microalbumin, Urine 0.64 mg/dL    Creatinine, Urine 92.70 mg/dL    Microalbumin/ Creatinine Ratio 6.9 <30.0 mg/g    POCT Glycohemoglobin Analyzer    Collection Time: 06/14/24  8:35 AM   Result Value Ref Range    Hemoglobin A1C, POC 7.4 (A) 4.5 - 5.6 %       ASSESSMENT:   1. Type 1 diabetes mellitus without complication  (CMD)        Type 1 diabetes in good control.  His HgbA1c has improved slightly since last visit.  He is on a T slim pump and uses a DexCom sensor in [employer].  He will take his pump off for soccer and does not always suspend it.       Missed insulin boluses for snack/meal intake.    CGM Report:  Continuous glucose monitoring data from the past 2 weeks was reviewed and discussed with family.  The average BG was 172 mg/dl.  Patient spent 50% of time in range.  They spent 47% of time above target and 3% of time below target.  There is a pattern of elevated BG levels after some meals and occasionally overnight.     Education: Peds Endo Diabetes Education: carbohydrate counting and physical activity were discussed today.     PLAN:   1.  Follow-up in pediatric endocrine clinic in 3 months  2.  Insulin changes and lab tests as planned at clinic visit per instructions.   3.  Monitor blood sugars pre-meals, pre-snack, bedtime and overnight. If on CGM check BG to calibrate and verify high or low blood sugars.   4.  Communicate blood sugar/ carbohydrate and insulin log to the office with instructions provided. Download CGM sensor data for review in 4 weeks   5.  Dylon will work to let parents know when the pump is alerting so they can make sure he stays in automated mode  6.  Set alarms to remind him to suspend pump for soccer and games.  7.  Great job entering all carbs!!!!    Goal for next visit: lower HgbA1c      Nataly Boudreaux MD     The 21st Century Cures Act makes medical notes like these available to patients in the interest of transparency. However, be advised this is a medical document. It is intended as peer to peer communication. It is written in medical language and may contain abbreviations,  jargon, and other verbiage that could be misleading or confusing to lay persons. It may appear blunt or direct. Medical documents are intended to carry relevant information, facts as evident, and the clinical opinion of the physician.     No significant past surgical history

## 2024-06-26 ENCOUNTER — OUTPATIENT (OUTPATIENT)
Dept: OUTPATIENT SERVICES | Facility: HOSPITAL | Age: 69
LOS: 1 days | End: 2024-06-26
Payer: MEDICARE

## 2024-06-26 ENCOUNTER — APPOINTMENT (OUTPATIENT)
Dept: INTERNAL MEDICINE | Facility: CLINIC | Age: 69
End: 2024-06-26
Payer: MEDICARE

## 2024-06-26 DIAGNOSIS — Z00.00 ENCOUNTER FOR GENERAL ADULT MEDICAL EXAMINATION WITHOUT ABNORMAL FINDINGS: ICD-10-CM

## 2024-06-26 DIAGNOSIS — E11.9 TYPE 2 DIABETES MELLITUS W/OUT COMPLICATIONS: ICD-10-CM

## 2024-06-26 LAB
APPEARANCE: CLEAR
BILIRUBIN URINE: NEGATIVE
BLOOD URINE: NEGATIVE
COLOR: YELLOW
GLUCOSE QUALITATIVE U: NEGATIVE MG/DL
KETONES URINE: NEGATIVE MG/DL
LEUKOCYTE ESTERASE URINE: NEGATIVE
NITRITE URINE: NEGATIVE
PH URINE: 5.5
PROTEIN URINE: NEGATIVE MG/DL
SPECIFIC GRAVITY URINE: 1.02
UROBILINOGEN URINE: 0.2 MG/DL

## 2024-06-26 PROCEDURE — 99442: CPT | Mod: 93

## 2024-06-26 PROCEDURE — 99213 OFFICE O/P EST LOW 20 MIN: CPT

## 2024-07-02 ENCOUNTER — OUTPATIENT (OUTPATIENT)
Dept: OUTPATIENT SERVICES | Facility: HOSPITAL | Age: 69
LOS: 1 days | End: 2024-07-02
Payer: MEDICARE

## 2024-07-02 ENCOUNTER — APPOINTMENT (OUTPATIENT)
Dept: NUTRITION | Facility: CLINIC | Age: 69
End: 2024-07-02

## 2024-07-02 DIAGNOSIS — E11.9 TYPE 2 DIABETES MELLITUS WITHOUT COMPLICATIONS: ICD-10-CM

## 2024-07-02 PROCEDURE — 97802 MEDICAL NUTRITION INDIV IN: CPT

## 2024-07-17 DIAGNOSIS — E11.9 TYPE 2 DIABETES MELLITUS WITHOUT COMPLICATIONS: ICD-10-CM

## 2024-09-06 NOTE — ED ADULT NURSE NOTE - CHPI ED NUR SYMPTOMS NEG
Patient presents for Ilumya injection today.     I am an RN. Does the patient have an active anti-cancer treatment plan? (oral, injection, or IV) No.    Reviewed and verified Advanced Directives: No: Patient declined to create/provide document at this time     Pre-Injection Information: Allergies reviewed as required for injection type., Pt states feeling well, no complaints., and Pt denies signs and symptoms of infection.    Refer to MAR (medication administration record) for type of injection and medication given.  Needle Size: See packaging  Patient tolerated well: Stable and Follow up appointment scheduled    Katelyn FLETCHER is supervising clinician today.   no body aches/no chills/no confusion/no decreased eating/drinking/no fever/no inflammation/no petechia/no scaly patches on skin/no vomiting

## 2024-11-06 ENCOUNTER — APPOINTMENT (OUTPATIENT)
Dept: GASTROENTEROLOGY | Facility: CLINIC | Age: 69
End: 2024-11-06

## 2024-11-12 ENCOUNTER — NON-APPOINTMENT (OUTPATIENT)
Age: 69
End: 2024-11-12

## 2024-12-02 ENCOUNTER — OUTPATIENT (OUTPATIENT)
Dept: OUTPATIENT SERVICES | Facility: HOSPITAL | Age: 69
LOS: 1 days | End: 2024-12-02
Payer: MEDICARE

## 2024-12-02 ENCOUNTER — RX RENEWAL (OUTPATIENT)
Age: 69
End: 2024-12-02

## 2024-12-02 ENCOUNTER — APPOINTMENT (OUTPATIENT)
Dept: INTERNAL MEDICINE | Facility: CLINIC | Age: 69
End: 2024-12-02
Payer: MEDICARE

## 2024-12-02 VITALS
DIASTOLIC BLOOD PRESSURE: 75 MMHG | HEIGHT: 65 IN | HEART RATE: 75 BPM | BODY MASS INDEX: 29.82 KG/M2 | WEIGHT: 179 LBS | SYSTOLIC BLOOD PRESSURE: 129 MMHG | OXYGEN SATURATION: 98 % | TEMPERATURE: 97.2 F

## 2024-12-02 DIAGNOSIS — Z00.00 ENCOUNTER FOR GENERAL ADULT MEDICAL EXAMINATION WITHOUT ABNORMAL FINDINGS: ICD-10-CM

## 2024-12-02 DIAGNOSIS — F32.A DEPRESSION, UNSPECIFIED: ICD-10-CM

## 2024-12-02 DIAGNOSIS — J30.9 ALLERGIC RHINITIS, UNSPECIFIED: ICD-10-CM

## 2024-12-02 DIAGNOSIS — E78.5 HYPERLIPIDEMIA, UNSPECIFIED: ICD-10-CM

## 2024-12-02 DIAGNOSIS — J45.909 UNSPECIFIED ASTHMA, UNCOMPLICATED: ICD-10-CM

## 2024-12-02 DIAGNOSIS — M25.569 PAIN IN UNSPECIFIED KNEE: ICD-10-CM

## 2024-12-02 PROCEDURE — 83036 HEMOGLOBIN GLYCOSYLATED A1C: CPT

## 2024-12-02 PROCEDURE — 36415 COLL VENOUS BLD VENIPUNCTURE: CPT

## 2024-12-02 PROCEDURE — G2211 COMPLEX E/M VISIT ADD ON: CPT

## 2024-12-02 PROCEDURE — 99214 OFFICE O/P EST MOD 30 MIN: CPT

## 2024-12-02 RX ORDER — DICLOFENAC SODIUM 10 MG/G
1 GEL TOPICAL DAILY
Qty: 1 | Refills: 0 | Status: ACTIVE | COMMUNITY
Start: 2024-12-02 | End: 1900-01-01

## 2024-12-03 DIAGNOSIS — Z00.00 ENCOUNTER FOR GENERAL ADULT MEDICAL EXAMINATION WITHOUT ABNORMAL FINDINGS: ICD-10-CM

## 2024-12-05 DIAGNOSIS — M25.569 PAIN IN UNSPECIFIED KNEE: ICD-10-CM

## 2024-12-05 DIAGNOSIS — E78.5 HYPERLIPIDEMIA, UNSPECIFIED: ICD-10-CM

## 2024-12-05 DIAGNOSIS — Z00.00 ENCOUNTER FOR GENERAL ADULT MEDICAL EXAMINATION WITHOUT ABNORMAL FINDINGS: ICD-10-CM

## 2024-12-05 DIAGNOSIS — J45.909 UNSPECIFIED ASTHMA, UNCOMPLICATED: ICD-10-CM

## 2024-12-05 DIAGNOSIS — J30.9 ALLERGIC RHINITIS, UNSPECIFIED: ICD-10-CM

## 2024-12-05 DIAGNOSIS — F32.A DEPRESSION, UNSPECIFIED: ICD-10-CM

## 2024-12-11 RX ORDER — METFORMIN HYDROCHLORIDE 500 MG/1
500 TABLET, COATED ORAL DAILY
Qty: 30 | Refills: 5 | Status: ACTIVE | COMMUNITY
Start: 2024-12-11 | End: 1900-01-01

## 2024-12-15 ENCOUNTER — EMERGENCY (EMERGENCY)
Facility: HOSPITAL | Age: 69
LOS: 0 days | Discharge: HOME | End: 2024-12-15
Attending: EMERGENCY MEDICINE
Payer: MEDICARE

## 2024-12-15 VITALS
SYSTOLIC BLOOD PRESSURE: 129 MMHG | HEIGHT: 65 IN | TEMPERATURE: 98 F | OXYGEN SATURATION: 98 % | DIASTOLIC BLOOD PRESSURE: 84 MMHG | WEIGHT: 179.9 LBS | HEART RATE: 79 BPM | RESPIRATION RATE: 18 BRPM

## 2024-12-15 DIAGNOSIS — E78.5 HYPERLIPIDEMIA, UNSPECIFIED: ICD-10-CM

## 2024-12-15 DIAGNOSIS — F32.9 MAJOR DEPRESSIVE DISORDER, SINGLE EPISODE, UNSPECIFIED: ICD-10-CM

## 2024-12-15 DIAGNOSIS — B34.9 VIRAL INFECTION, UNSPECIFIED: ICD-10-CM

## 2024-12-15 DIAGNOSIS — R09.81 NASAL CONGESTION: ICD-10-CM

## 2024-12-15 DIAGNOSIS — J02.9 ACUTE PHARYNGITIS, UNSPECIFIED: ICD-10-CM

## 2024-12-15 DIAGNOSIS — Z88.5 ALLERGY STATUS TO NARCOTIC AGENT: ICD-10-CM

## 2024-12-15 PROCEDURE — 71046 X-RAY EXAM CHEST 2 VIEWS: CPT

## 2024-12-15 PROCEDURE — 0241U: CPT

## 2024-12-15 PROCEDURE — 99283 EMERGENCY DEPT VISIT LOW MDM: CPT | Mod: 25

## 2024-12-15 PROCEDURE — 99284 EMERGENCY DEPT VISIT MOD MDM: CPT | Mod: FS

## 2024-12-15 PROCEDURE — 71046 X-RAY EXAM CHEST 2 VIEWS: CPT | Mod: 26

## 2024-12-15 NOTE — ED PROVIDER NOTE - PATIENT PORTAL LINK FT
You can access the FollowMyHealth Patient Portal offered by [facility] by registering at the following website: [email or web address]. By joining [employer]’s FollowMyHealth portal, you will also be able to view your health information using other applications (apps) compatible with our system.

## 2024-12-15 NOTE — ED PROVIDER NOTE - NSFOLLOWUPINSTRUCTIONS_ED_ALL_ED_FT
Follow up with PMD.    Viral Respiratory Infection    A viral respiratory infection is an illness that affects parts of the body used for breathing, like the lungs, nose, and throat. It is caused by a germ called a virus. Symptoms can include runny nose, coughing, sneezing, fatigue, body aches, sore throat, fever, or headache. Over the counter medicine can be used to manage the symptoms but the infection typically goes away on its own in 5 to 10 days.     SEEK IMMEDIATE MEDICAL CARE IF YOU HAVE ANY OF THE FOLLOWING SYMPTOMS: shortness of breath, chest pain, fever over 10 days, or lightheadedness/dizziness.

## 2024-12-15 NOTE — ED ADULT NURSE NOTE - NSFALLUNIVINTERV_ED_ALL_ED
Bed/Stretcher in lowest position, wheels locked, appropriate side rails in place/Call bell, personal items and telephone in reach/Instruct patient to call for assistance before getting out of bed/chair/stretcher/Non-slip footwear applied when patient is off stretcher/[place] to call system/Physically safe environment - no spills, clutter or unnecessary equipment/Purposeful proactive rounding/Room/bathroom lighting operational, light cord in reach

## 2024-12-15 NOTE — ED PROVIDER NOTE - OBJECTIVE STATEMENT
69-year-old female past medical history of hyperlipidemia, depression presents for evaluation.  Patient states since Wednesday she has nasal congestion, sore throat and productive cough, no aggravating relieving factors.  Denies fever, headache, chest pain, shortness of breath, abdominal pain, dysuria, hematuria, vomiting, diarrhea, earache.

## 2024-12-15 NOTE — ED PROVIDER NOTE - CLINICAL SUMMARY MEDICAL DECISION MAKING FREE TEXT BOX
69-year-old history of hyperlipidemia depression coming in here for nasal congestion sore throat viral URI-like symptoms.  Swab for COVID/flu/RSV.  Chest x-ray negative.    Independently interpreted by Darrian Duke MD:  XR interpretation: No cardiopulmonary disease,

## 2024-12-15 NOTE — ED PROVIDER NOTE - PHYSICAL EXAMINATION
CONST: NAD  EYES: Sclera and conjunctiva clear.   ENT: Clear nasal discharge. Oropharynx mild erythema. No abscess or swelling. Uvula midline.   NECK: Non-tender, no meningeal signs. normal ROM. supple   CARD: S1 S2; No jvd  RESP: Equal BS B/L, No wheezes, rhonchi or rales. No distress  GI: Soft, non-tender, non-distended. normal BS  MS: Normal ROM in all extremities. pulses 2 +. no calf tenderness or swelling  SKIN: Warm, dry, no acute rashes. Good turgor

## 2024-12-16 DIAGNOSIS — R91.1 SOLITARY PULMONARY NODULE: ICD-10-CM

## 2024-12-16 DIAGNOSIS — J98.4 OTHER DISORDERS OF LUNG: ICD-10-CM

## 2024-12-16 LAB
FLUAV AG NPH QL: SIGNIFICANT CHANGE UP
FLUBV AG NPH QL: SIGNIFICANT CHANGE UP
RSV RNA NPH QL NAA+NON-PROBE: SIGNIFICANT CHANGE UP
SARS-COV-2 RNA SPEC QL NAA+PROBE: DETECTED

## 2024-12-16 NOTE — ED POST DISCHARGE NOTE - RESULT SUMMARY
CXR- There is a nodular appearing opacity overlying the posterior lung field on lateral projection which is incompletely evaluated. Recommend CT chest

## 2025-01-05 ENCOUNTER — OUTPATIENT (OUTPATIENT)
Dept: OUTPATIENT SERVICES | Facility: HOSPITAL | Age: 70
LOS: 1 days | End: 2025-01-05
Payer: MEDICARE

## 2025-01-05 DIAGNOSIS — R91.1 SOLITARY PULMONARY NODULE: ICD-10-CM

## 2025-01-05 DIAGNOSIS — Z00.8 ENCOUNTER FOR OTHER GENERAL EXAMINATION: ICD-10-CM

## 2025-01-05 PROCEDURE — 71250 CT THORAX DX C-: CPT

## 2025-01-05 PROCEDURE — 71250 CT THORAX DX C-: CPT | Mod: 26,MH

## 2025-01-06 DIAGNOSIS — R91.1 SOLITARY PULMONARY NODULE: ICD-10-CM

## 2025-01-13 ENCOUNTER — RX RENEWAL (OUTPATIENT)
Age: 70
End: 2025-01-13

## 2025-04-10 ENCOUNTER — NON-APPOINTMENT (OUTPATIENT)
Age: 70
End: 2025-04-10

## 2025-04-14 ENCOUNTER — APPOINTMENT (OUTPATIENT)
Dept: INTERNAL MEDICINE | Facility: CLINIC | Age: 70
End: 2025-04-14

## 2025-04-17 ENCOUNTER — NON-APPOINTMENT (OUTPATIENT)
Age: 70
End: 2025-04-17

## 2025-04-30 ENCOUNTER — RX RENEWAL (OUTPATIENT)
Age: 70
End: 2025-04-30

## 2025-05-12 NOTE — ASSESSMENT
Physical Therapy Visit    Visit Type: Daily Treatment Note  Visit: 2  Referring Provider: Giuliano Starkey MD  Medical Diagnosis (from order): M54.16 - Left lumbar radiculopathy     SUBJECTIVE                                                                                                               This visit is being performed virtually via Video visit using Thomas-Krenn Melina.   Clinical [facility] [place], [address] [place] [address]  [facility] location Home and is physically present in   the [facility] at the time of this visit.     Patient was able to run a few times this past without foot slap symptoms. Runs were shorter. Has been consistent with home exercise program and notes a lot of difficulty with single leg stance exercises.       OBJECTIVE                                                                                                                                         Treatment     Therapeutic Exercise  Education zones of impingement - kinetic chain, deep peroneal nerve irritation, loosening shoe    Eccentric control ankle eversion  Monster walks theraband resistance to arch of feet  Single leg stance 3 point taps  Tandem stance    Update and review home exercise program     Skilled input: verbal instruction/cues and as detailed above    Writer verbally educated and received verbal consent for hand placement, positioning of patient, and techniques to be performed today from patient for hand placement and palpation for techniques, clothing adjustments for techniques and therapist position for techniques as described above and how they are pertinent to the patient's plan of care.  Home Exercise Program  Access Code: [email or web address]  URL: [email or web address]  Date: 05/12/2025  Prepared by: Che Grant    Exercises  - Band Walks  - 1 x daily - 7 x weekly - 2 sets  - Seated Ankle Eversion with Resistance  - 1 x daily - 7 x weekly - 2 sets - 10 reps  -  [FreeTextEntry1] : 69-year-old female patient with PMH of preDM, DL, and depression presenting for routine follow up. Complains of left hip pain in this time that is worse with activity.   #DL: -C/w rosuvastatin 40mg OD - Lipid panel for next visit  #preDM: -A1c 6.2 (May 2022) -Encouraged lifestyle modification including diet and exercise -repeat A1C for next visit  #left hip pain: chronic - no trauma - suggestive of OA - Check left hip Xray - Tylenol prn   #allergic rhinitis: - uses albuterol prn - Claritin prn  #Depression -c/w Bupropion   #HCM: -Colonoscopy done 11 y ago: non remarkable per patient (is due for repeat now) -GI referral given -Pap smear: Co testing done 2 years ago- WNL; no need for repeat testing -Due for Mammogram: -referral given -Dexa referral given -RTC in 2 months with routine blood work     Tandem Stance  - 1 x daily - 7 x weekly - 3 sets - 20 s hold  - Single Leg Balance with Clock Reach  - 1 x daily - 7 x weekly - 2-3 sets - 6 reps  - Standing Runner Hip Flexion Extension  - 1 x daily - 3 x weekly - 2-3 sets - 10 reps  - Clamshell  - 1 x daily - 3 x weekly - 2-3 sets - 10 reps  - Standing Hip Flexor Stretch  - 3 x daily - 7 x weekly - 20 s hold  - Walking Hamstring Stretch  - 3 x daily - 7 x weekly - 20 s hold  - Bent Knee Fallouts  - 1 x daily - 7 x weekly - 10 reps      ASSESSMENT                                                                                                            Patient has been consisent with home exercise program. Has not had symptoms in past week but does note difficulty with stability in single leg stance. Targeted eversion eccentric control to prevent posturing in peroneal nerve stressed positions. Regressed single leg stance stability exercises. Patient can continue to benefit from skilled physical therapy to progress towards goals and improve function.  A hybrid model of care with virtual visits is appropriate to meet patient's needs and convenience to achieve their goals and return to prior level of functional activity.      Education:   - Results of above outlined education: Verbalizes understanding, Demonstrates understanding and Needs reinforcement    PLAN                                                                                                                           Suggestions for next session as indicated: Progress per plan of care, single leg stance controlled mobility, nerve sensitivity       Therapy procedure time and total treatment time can be found documented on the Time Entry flowsheet

## 2025-05-21 ENCOUNTER — RX RENEWAL (OUTPATIENT)
Age: 70
End: 2025-05-21

## 2025-05-28 ENCOUNTER — APPOINTMENT (OUTPATIENT)
Dept: INTERNAL MEDICINE | Facility: CLINIC | Age: 70
End: 2025-05-28
Payer: MEDICARE

## 2025-05-28 ENCOUNTER — APPOINTMENT (OUTPATIENT)
Dept: GASTROENTEROLOGY | Facility: CLINIC | Age: 70
End: 2025-05-28

## 2025-05-28 ENCOUNTER — OUTPATIENT (OUTPATIENT)
Dept: OUTPATIENT SERVICES | Facility: HOSPITAL | Age: 70
LOS: 1 days | End: 2025-05-28
Payer: MEDICARE

## 2025-05-28 ENCOUNTER — APPOINTMENT (OUTPATIENT)
Dept: INTERNAL MEDICINE | Facility: CLINIC | Age: 70
End: 2025-05-28

## 2025-05-28 VITALS
HEART RATE: 85 BPM | TEMPERATURE: 97 F | OXYGEN SATURATION: 98 % | DIASTOLIC BLOOD PRESSURE: 71 MMHG | WEIGHT: 173 LBS | HEIGHT: 65 IN | SYSTOLIC BLOOD PRESSURE: 110 MMHG | BODY MASS INDEX: 28.82 KG/M2

## 2025-05-28 DIAGNOSIS — Z00.00 ENCOUNTER FOR GENERAL ADULT MEDICAL EXAMINATION WITHOUT ABNORMAL FINDINGS: ICD-10-CM

## 2025-05-28 DIAGNOSIS — E78.5 HYPERLIPIDEMIA, UNSPECIFIED: ICD-10-CM

## 2025-05-28 DIAGNOSIS — Z00.00 ENCOUNTER FOR GENERAL ADULT MEDICAL EXAMINATION W/OUT ABNORMAL FINDINGS: ICD-10-CM

## 2025-05-28 DIAGNOSIS — R73.03 PREDIABETES.: ICD-10-CM

## 2025-05-28 PROCEDURE — 99213 OFFICE O/P EST LOW 20 MIN: CPT

## 2025-05-28 PROCEDURE — G2211 COMPLEX E/M VISIT ADD ON: CPT

## 2025-06-02 ENCOUNTER — APPOINTMENT (OUTPATIENT)
Dept: INTERNAL MEDICINE | Facility: CLINIC | Age: 70
End: 2025-06-02

## 2025-06-07 ENCOUNTER — RESULT REVIEW (OUTPATIENT)
Age: 70
End: 2025-06-07

## 2025-06-07 ENCOUNTER — OUTPATIENT (OUTPATIENT)
Dept: OUTPATIENT SERVICES | Facility: HOSPITAL | Age: 70
LOS: 1 days | End: 2025-06-07
Payer: MEDICARE

## 2025-06-07 DIAGNOSIS — Z12.31 ENCOUNTER FOR SCREENING MAMMOGRAM FOR MALIGNANT NEOPLASM OF BREAST: ICD-10-CM

## 2025-06-07 PROCEDURE — 77063 BREAST TOMOSYNTHESIS BI: CPT

## 2025-06-07 PROCEDURE — 77063 BREAST TOMOSYNTHESIS BI: CPT | Mod: 26

## 2025-06-07 PROCEDURE — 77067 SCR MAMMO BI INCL CAD: CPT

## 2025-06-07 PROCEDURE — 77067 SCR MAMMO BI INCL CAD: CPT | Mod: 26

## 2025-06-08 DIAGNOSIS — Z12.31 ENCOUNTER FOR SCREENING MAMMOGRAM FOR MALIGNANT NEOPLASM OF BREAST: ICD-10-CM

## 2025-07-15 NOTE — ED ADULT NURSE NOTE - IN THE PAST 12 MONTHS HAVE YOU USED DRUGS OTHER THAN THOSE REQUIRED FOR MEDICAL REASON?
· Assessment	    1. 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 Recommend continuing  treatment inpatient, His swallowing is better.  breathing better.  mass smaller  - Recent biopsy thyroid at [facility]- f/u pathology   - Patient had a PET scans scheduled outpatient today at [facility].  Will need to reschedule.  - [place]ngo[place] [facility] [place]     2 Iliopsoas Hematoma. Continue to hold Lovenox.   -  probably lovenox injected IM rather sq.  off AC now. Follow up re-evaluation outpatient along with PET.  - repeat CT showed decrease in size of hematoma.   -  will do Dopplers to evaluate  if IJ thrombus still present.  Otherwise asymptomatic.  -  Will follow.    3. Anemia in the setting of acute bleed. Check iron studies, B12, folate and thyroid -panel   - s/p 1 u transfusion, hb better at 8 today.     4 Afebrile.  No dysuria or increasing cough.  BC done.  will do CXR, on zozyn.   fever is down today. No

## 2025-08-20 ENCOUNTER — EMERGENCY (EMERGENCY)
Facility: HOSPITAL | Age: 70
LOS: 0 days | Discharge: HOME | End: 2025-08-20
Attending: EMERGENCY MEDICINE
Payer: MEDICARE

## 2025-08-20 VITALS
HEART RATE: 100 BPM | OXYGEN SATURATION: 96 % | DIASTOLIC BLOOD PRESSURE: 65 MMHG | TEMPERATURE: 98 F | SYSTOLIC BLOOD PRESSURE: 127 MMHG | WEIGHT: 170.2 LBS | HEIGHT: 65 IN | RESPIRATION RATE: 18 BRPM

## 2025-08-20 DIAGNOSIS — R09.81 NASAL CONGESTION: ICD-10-CM

## 2025-08-20 DIAGNOSIS — H10.9 UNSPECIFIED CONJUNCTIVITIS: ICD-10-CM

## 2025-08-20 DIAGNOSIS — Z88.6 ALLERGY STATUS TO ANALGESIC AGENT: ICD-10-CM

## 2025-08-20 DIAGNOSIS — H57.89 OTHER SPECIFIED DISORDERS OF EYE AND ADNEXA: ICD-10-CM

## 2025-08-20 PROCEDURE — 99283 EMERGENCY DEPT VISIT LOW MDM: CPT

## 2025-08-20 PROCEDURE — 99283 EMERGENCY DEPT VISIT LOW MDM: CPT | Mod: FS

## 2025-08-20 RX ORDER — POLYMYXIN B SULFATE AND TRIMETHOPRIM SULFATE 10000; 1 [USP'U]/ML; MG/ML
2 SOLUTION/ DROPS OPHTHALMIC EVERY 4 HOURS
Refills: 0 | Status: DISCONTINUED | OUTPATIENT
Start: 2025-08-20 | End: 2025-08-20

## 2025-08-20 RX ORDER — FLUTICASONE PROPIONATE 50 UG/1
1 SPRAY, METERED NASAL
Qty: 1 | Refills: 0
Start: 2025-08-20 | End: 2025-08-24

## 2025-08-20 RX ADMIN — POLYMYXIN B SULFATE AND TRIMETHOPRIM SULFATE 2 DROP(S): 10000; 1 SOLUTION/ DROPS OPHTHALMIC at 12:45

## 2025-08-22 ENCOUNTER — EMERGENCY (EMERGENCY)
Facility: HOSPITAL | Age: 70
LOS: 0 days | Discharge: HOME | End: 2025-08-22
Attending: EMERGENCY MEDICINE
Payer: MEDICARE

## 2025-08-22 VITALS
HEIGHT: 65 IN | TEMPERATURE: 99 F | WEIGHT: 169.98 LBS | HEART RATE: 99 BPM | OXYGEN SATURATION: 96 % | RESPIRATION RATE: 18 BRPM | SYSTOLIC BLOOD PRESSURE: 127 MMHG | DIASTOLIC BLOOD PRESSURE: 78 MMHG

## 2025-08-22 DIAGNOSIS — J02.9 ACUTE PHARYNGITIS, UNSPECIFIED: ICD-10-CM

## 2025-08-22 DIAGNOSIS — Z88.5 ALLERGY STATUS TO NARCOTIC AGENT: ICD-10-CM

## 2025-08-22 DIAGNOSIS — Z86.69 PERSONAL HISTORY OF OTHER DISEASES OF THE NERVOUS SYSTEM AND SENSE ORGANS: ICD-10-CM

## 2025-08-22 PROCEDURE — 99284 EMERGENCY DEPT VISIT MOD MDM: CPT

## 2025-08-22 PROCEDURE — 99283 EMERGENCY DEPT VISIT LOW MDM: CPT

## 2025-08-22 RX ORDER — IBUPROFEN 200 MG
400 TABLET ORAL ONCE
Refills: 0 | Status: COMPLETED | OUTPATIENT
Start: 2025-08-22 | End: 2025-08-22

## 2025-08-22 RX ORDER — DEXAMETHASONE 0.5 MG/1
10 TABLET ORAL ONCE
Refills: 0 | Status: COMPLETED | OUTPATIENT
Start: 2025-08-22 | End: 2025-08-22

## 2025-08-22 RX ORDER — AMOXICILLIN 500 MG/1
1 CAPSULE ORAL
Qty: 20 | Refills: 0
Start: 2025-08-22 | End: 2025-08-31

## 2025-08-22 RX ADMIN — DEXAMETHASONE 10 MILLIGRAM(S): 0.5 TABLET ORAL at 11:26

## 2025-08-22 RX ADMIN — Medication 400 MILLIGRAM(S): at 11:26

## 2025-09-08 ENCOUNTER — RX RENEWAL (OUTPATIENT)
Age: 70
End: 2025-09-08